# Patient Record
Sex: FEMALE | Race: BLACK OR AFRICAN AMERICAN | NOT HISPANIC OR LATINO | Employment: PART TIME | URBAN - METROPOLITAN AREA
[De-identification: names, ages, dates, MRNs, and addresses within clinical notes are randomized per-mention and may not be internally consistent; named-entity substitution may affect disease eponyms.]

---

## 2018-02-08 ENCOUNTER — TRANSCRIBE ORDERS (OUTPATIENT)
Dept: LAB | Facility: MEDICAL CENTER | Age: 23
End: 2018-02-08

## 2018-02-08 ENCOUNTER — OFFICE VISIT (OUTPATIENT)
Dept: OBGYN CLINIC | Facility: MEDICAL CENTER | Age: 23
End: 2018-02-08
Payer: COMMERCIAL

## 2018-02-08 ENCOUNTER — LAB (OUTPATIENT)
Dept: LAB | Facility: MEDICAL CENTER | Age: 23
End: 2018-02-08
Payer: COMMERCIAL

## 2018-02-08 VITALS
DIASTOLIC BLOOD PRESSURE: 70 MMHG | WEIGHT: 129 LBS | SYSTOLIC BLOOD PRESSURE: 120 MMHG | HEIGHT: 65 IN | BODY MASS INDEX: 21.49 KG/M2

## 2018-02-08 DIAGNOSIS — Z77.21 PERSONAL HISTORY OF EXPOSURE TO POTENTIALLY HAZARDOUS BODY FLUIDS: ICD-10-CM

## 2018-02-08 DIAGNOSIS — Z01.419 ENCOUNTER FOR GYNECOLOGICAL EXAMINATION WITHOUT ABNORMAL FINDING: Primary | ICD-10-CM

## 2018-02-08 LAB
HBV SURFACE AG SER QL: NORMAL
HCV AB SER QL: NORMAL

## 2018-02-08 PROCEDURE — 86803 HEPATITIS C AB TEST: CPT

## 2018-02-08 PROCEDURE — 87389 HIV-1 AG W/HIV-1&-2 AB AG IA: CPT

## 2018-02-08 PROCEDURE — 36415 COLL VENOUS BLD VENIPUNCTURE: CPT

## 2018-02-08 PROCEDURE — G0145 SCR C/V CYTO,THINLAYER,RESCR: HCPCS | Performed by: NURSE PRACTITIONER

## 2018-02-08 PROCEDURE — 87491 CHLMYD TRACH DNA AMP PROBE: CPT | Performed by: NURSE PRACTITIONER

## 2018-02-08 PROCEDURE — 87591 N.GONORRHOEAE DNA AMP PROB: CPT | Performed by: NURSE PRACTITIONER

## 2018-02-08 PROCEDURE — 86592 SYPHILIS TEST NON-TREP QUAL: CPT

## 2018-02-08 PROCEDURE — 99395 PREV VISIT EST AGE 18-39: CPT | Performed by: NURSE PRACTITIONER

## 2018-02-08 PROCEDURE — 87340 HEPATITIS B SURFACE AG IA: CPT

## 2018-02-08 RX ORDER — DROSPIRENONE AND ETHINYL ESTRADIOL 0.02-3(28)
1 KIT ORAL DAILY
COMMUNITY
End: 2018-02-08 | Stop reason: ALTCHOICE

## 2018-02-08 RX ORDER — SULFAMETHOXAZOLE AND TRIMETHOPRIM 800; 160 MG/1; MG/1
1 TABLET ORAL
COMMUNITY
Start: 2015-06-03 | End: 2018-02-08 | Stop reason: ALTCHOICE

## 2018-02-08 NOTE — PROGRESS NOTES
Josh Smith is a 23 y o  Darleen Gali (eTOP x1, medical Ab in 2016) female who presents for a routine annual GYN exam  Previous GYN care was at a family practice in [place] , had routine visit last year per patient  Patient's last menstrual period was 2018 (exact date)  Periods are regular every 27 days, lasting 5 days  Not currently sexually active  Desires STI testing today  Uses condoms as birth control, does not desire any other form at this time  Reports that she had Gardasil vaccination series at our facility years ago  Currently unemployed, was working as an  at [employer] previously  Gynecologic History  Last Pap smear: 2017  Results were: normal (per patient report, no records available)  History of abnormal Pap smear: no (per patient report)  Regular self breast exam: irregularly  Family history of breast cancer: no  Family history of uterine or ovarian cancer: no  Current contraception: condoms    Counseled on breast self-exam, STD prevention, HIV risk factors and prevention and family planning choices        Obstetric History  OB History    Para Term  AB Living   1 0     1 0   SAB TAB Ectopic Multiple Live Births                  # Outcome Date GA Lbr Tom/2nd Weight Sex Delivery Anes PTL Lv   1 AB 2016 5w0d               The following portions of the patient's history were reviewed and updated as appropriate: allergies, current medications, past family history, past medical history, past social history, past surgical history and problem list       Review of Systems  Review of Systems - General ROS: negative for - chills or fever  Psychological ROS: negative for - depression  Breast ROS: negative for breast lumps  Respiratory ROS: no cough, shortness of breath, or wheezing  Cardiovascular ROS: no chest pain or dyspnea on exertion  Gastrointestinal ROS: no abdominal pain, change in bowel habits, or black or bloody stools  Genito-Urinary ROS: negative for - change in menstrual cycle, genital ulcers, urinary frequency/urgency or vulvar/vaginal symptoms        Objective    /70   Ht 5' 5" (1 651 m)   Wt 58 5 kg (129 lb)   LMP 01/26/2018 (Exact Date)   Breastfeeding? No   BMI 21 47 kg/m²     Physical Exam   Constitutional: She is oriented to person, place, and time  Vital signs are normal  She appears well-developed and well-nourished  Genitourinary: Vagina normal and uterus normal  Pelvic exam was performed with patient supine  There is no rash, tenderness or lesion on the right labia  There is no rash, tenderness or lesion on the left labia  Right adnexum does not display mass and does not display tenderness  Left adnexum does not display mass and does not display tenderness  Cervix does not exhibit motion tenderness  Uterus is retroverted  HENT:   Head: Normocephalic and atraumatic  Neck: No thyromegaly present  Cardiovascular: Normal heart sounds  Pulmonary/Chest: Effort normal and breath sounds normal  Right breast exhibits no inverted nipple, no mass, no nipple discharge, no skin change and no tenderness  Left breast exhibits no inverted nipple, no mass, no nipple discharge, no skin change and no tenderness  Abdominal: Soft  Normal appearance  Lymphadenopathy:     She has no axillary adenopathy  Neurological: She is alert and oriented to person, place, and time  Skin: Skin is warm, dry and intact  Psychiatric: She has a normal mood and affect  Her behavior is normal    Vitals reviewed  Assessment   Normal female exam      Plan  Given [employer] pamphlet, she says that she does not do well with hormonal contraceptive methods due to mood swings, she will consider copper IUD and call if she decides she wants to have this placed      Encounter for gynecological examination without abnormal finding  -     Liquid-based pap, screening  -     Chlamydia/GC amplified DNA by PCR    Personal history of exposure to potentially hazardous body fluids  -     RPR; Future  -     Hepatitis B surface antigen; Future  -     Hepatitis C antibody; Future  -     Chlamydia/GC amplified DNA by PCR  -     HIV 1/2 AG-AB combo;  Future      ANALI Beard

## 2018-02-09 LAB
CHLAMYDIA DNA CVX QL NAA+PROBE: NORMAL
N GONORRHOEA DNA GENITAL QL NAA+PROBE: NORMAL

## 2018-02-10 LAB
HIV 1+2 AB+HIV1 P24 AG SERPL QL IA: NORMAL
RPR SER QL: NORMAL

## 2018-02-11 NOTE — PROGRESS NOTES
Please call patient regarding her NORMAL results - this is an add-on to previous message re: normal/negative STI testing  We are awaiting her pap smear results  Thank you!

## 2018-02-12 NOTE — PROGRESS NOTES
Tried calling patient and message was "the person you are trying to reach is not available at this time "

## 2018-02-14 LAB
LAB AP GYN PRIMARY INTERPRETATION: NORMAL
LAB AP LMP: NORMAL
Lab: NORMAL

## 2018-02-15 NOTE — PROGRESS NOTES
Pap smear results are normal, if we cannot get into contact with her by phone, can we send her a normal pap letter please? Thanks!

## 2020-01-07 ENCOUNTER — OFFICE VISIT (OUTPATIENT)
Dept: URGENT CARE | Facility: CLINIC | Age: 25
End: 2020-01-07
Payer: COMMERCIAL

## 2020-01-07 VITALS
TEMPERATURE: 98.3 F | BODY MASS INDEX: 21.54 KG/M2 | DIASTOLIC BLOOD PRESSURE: 70 MMHG | OXYGEN SATURATION: 98 % | WEIGHT: 126.2 LBS | HEIGHT: 64 IN | HEART RATE: 67 BPM | RESPIRATION RATE: 18 BRPM | SYSTOLIC BLOOD PRESSURE: 98 MMHG

## 2020-01-07 DIAGNOSIS — H00.015 HORDEOLUM EXTERNUM OF LEFT LOWER EYELID: Primary | ICD-10-CM

## 2020-01-07 PROCEDURE — G0383 LEV 4 HOSP TYPE B ED VISIT: HCPCS | Performed by: PHYSICIAN ASSISTANT

## 2020-01-07 RX ORDER — TOBRAMYCIN 3 MG/ML
1 SOLUTION/ DROPS OPHTHALMIC
Qty: 1 BOTTLE | Refills: 0 | Status: SHIPPED | OUTPATIENT
Start: 2020-01-07 | End: 2020-01-14

## 2020-01-07 NOTE — PATIENT INSTRUCTIONS
1  Stye  -Use eye drops as directed  -Warm compresses  -Wash hands well  -Throw out eye make-up    -F/U with PCP/Eye doctor if no improvement    Go to ER with worsening symptoms or any signs of distress

## 2020-01-07 NOTE — PROGRESS NOTES
[facility]        NAME: Sahil Lowery is a 30 y o  female  : 1995    MRN: [id number]  DATE: 2020  TIME: 6:22 PM    Assessment and Plan   Hordeolum externum of left lower eyelid [H00 015]  1  Hordeolum externum of left lower eyelid  tobramycin (TOBREX) 0 3 % SOLN         Patient Instructions     Patient Instructions   1  Stye  -Use eye drops as directed  -Warm compresses  -Wash hands well  -Throw out eye make-up    -F/U with PCP/Eye doctor if no improvement    Go to ER with worsening symptoms or any signs of distress     Follow up with PCP in 3-5 days  Proceed to  ER if symptoms worsen  Chief Complaint     Chief Complaint   Patient presents with    Eye Problem     L bottom eye lid  believes it may be a stye  started yesterday  History of Present Illness       Patient is a 61-year-old female who presents today for evaluation of pain and swelling to her left lower eyelid for the past couple days  Patient states she tried applying warm compresses however her symptoms have become worse  She rates her pain as a 5/10  No visual changes  No injury or trauma  No fever  Review of Systems   Review of Systems   Constitutional: Negative for chills and fever  HENT: Negative for ear pain, rhinorrhea and sore throat  Eyes: Positive for pain  Negative for photophobia and visual disturbance  Respiratory: Negative for shortness of breath  Cardiovascular: Negative for chest pain  Musculoskeletal: Negative for arthralgias  Neurological: Negative for headaches  All other systems reviewed and are negative          Current Medications       Current Outpatient Medications:     tobramycin (TOBREX) 0 3 % SOLN, Administer 1 drop into the left eye every 4 (four) hours while awake for 7 days, Disp: 1 Bottle, Rfl: 0    Current Allergies     Allergies as of 2020    (No Known Allergies)            The following portions of the patient's history were reviewed and updated as appropriate: allergies, current medications, past family history, past medical history, past social history, past surgical history and problem list      Past Medical History:   Diagnosis Date    Bacterial vaginitis     Carrier of ureaplasma urealyticum     Medical      Migraine     Urinary tract infection     Yeast vaginitis        History reviewed  No pertinent surgical history  Family History   Problem Relation Age of Onset    Obesity Mother     Hypertension Family     Diabetes Family     Osteoarthritis Family     Depression Family     Thyroid disease Family          Medications have been verified  Objective   BP 98/70 (BP Location: Left arm, Patient Position: Sitting)   Pulse 67   Temp 98 3 °F (36 8 °C) (Oral)   Resp 18   Ht 5' 4" (1 626 m)   Wt 57 2 kg (126 lb 3 2 oz)   SpO2 98%   BMI 21 66 kg/m²        Physical Exam     Physical Exam   Constitutional: She is oriented to person, place, and time  She appears well-developed and well-nourished  No distress  HENT:   Head: Normocephalic and atraumatic  Right Ear: Tympanic membrane, external ear and ear canal normal    Left Ear: Tympanic membrane, external ear and ear canal normal    Nose: Nose normal    Mouth/Throat: Uvula is midline, oropharynx is clear and moist and mucous membranes are normal    Eyes: Pupils are equal, round, and reactive to light  Conjunctivae and EOM are normal  Left eye exhibits hordeolum  Neck: Normal range of motion  Neck supple  Cardiovascular: Normal rate, regular rhythm and normal heart sounds  Pulmonary/Chest: Effort normal and breath sounds normal    Neurological: She is alert and oriented to person, place, and time  Skin: Skin is warm and dry  Psychiatric: She has a normal mood and affect  Nursing note and vitals reviewed

## 2020-01-08 ENCOUNTER — TELEPHONE (OUTPATIENT)
Dept: URGENT CARE | Facility: CLINIC | Age: 25
End: 2020-01-08

## 2020-01-08 NOTE — TELEPHONE ENCOUNTER
Patient called 1/8/2020 asking for a note for work for 1/8/2020  Patient was seen by Rob Jose on 1/7/2020

## 2020-04-04 ENCOUNTER — APPOINTMENT (OUTPATIENT)
Dept: RADIOLOGY | Facility: CLINIC | Age: 25
End: 2020-04-04
Payer: COMMERCIAL

## 2020-04-04 ENCOUNTER — OFFICE VISIT (OUTPATIENT)
Dept: URGENT CARE | Facility: CLINIC | Age: 25
End: 2020-04-04
Payer: COMMERCIAL

## 2020-04-04 VITALS
SYSTOLIC BLOOD PRESSURE: 104 MMHG | HEIGHT: 64 IN | RESPIRATION RATE: 16 BRPM | TEMPERATURE: 99.4 F | BODY MASS INDEX: 20.83 KG/M2 | OXYGEN SATURATION: 97 % | HEART RATE: 118 BPM | DIASTOLIC BLOOD PRESSURE: 70 MMHG | WEIGHT: 122 LBS

## 2020-04-04 DIAGNOSIS — M25.532 LEFT WRIST PAIN: Primary | ICD-10-CM

## 2020-04-04 DIAGNOSIS — M25.532 LEFT WRIST PAIN: ICD-10-CM

## 2020-04-04 PROCEDURE — 99283 EMERGENCY DEPT VISIT LOW MDM: CPT | Performed by: PHYSICIAN ASSISTANT

## 2020-04-04 PROCEDURE — G0382 LEV 3 HOSP TYPE B ED VISIT: HCPCS | Performed by: PHYSICIAN ASSISTANT

## 2020-04-04 PROCEDURE — 99213 OFFICE O/P EST LOW 20 MIN: CPT | Performed by: PHYSICIAN ASSISTANT

## 2020-04-04 PROCEDURE — 73110 X-RAY EXAM OF WRIST: CPT

## 2020-04-04 RX ORDER — CETIRIZINE HYDROCHLORIDE 10 MG/1
10 TABLET ORAL DAILY
COMMUNITY
End: 2021-12-15 | Stop reason: ALTCHOICE

## 2020-04-06 ENCOUNTER — HOSPITAL ENCOUNTER (EMERGENCY)
Facility: HOSPITAL | Age: 25
Discharge: HOME | End: 2020-04-06
Attending: EMERGENCY MEDICINE | Admitting: EMERGENCY MEDICINE
Payer: COMMERCIAL

## 2020-04-06 VITALS
HEIGHT: 64 IN | OXYGEN SATURATION: 99 % | TEMPERATURE: 98.7 F | DIASTOLIC BLOOD PRESSURE: 67 MMHG | RESPIRATION RATE: 16 BRPM | HEART RATE: 87 BPM | BODY MASS INDEX: 20.83 KG/M2 | WEIGHT: 122 LBS | SYSTOLIC BLOOD PRESSURE: 121 MMHG

## 2020-04-06 DIAGNOSIS — M25.532 LEFT WRIST PAIN: Primary | ICD-10-CM

## 2020-04-06 PROCEDURE — 99282 EMERGENCY DEPT VISIT SF MDM: CPT | Performed by: EMERGENCY MEDICINE

## 2020-04-06 PROCEDURE — 99283 EMERGENCY DEPT VISIT LOW MDM: CPT

## 2020-04-06 RX ORDER — NAPROXEN 500 MG/1
500 TABLET ORAL 2 TIMES DAILY PRN
Qty: 20 TABLET | Refills: 0 | Status: SHIPPED | OUTPATIENT
Start: 2020-04-06 | End: 2021-12-15 | Stop reason: ALTCHOICE

## 2020-04-06 RX ORDER — NAPROXEN 250 MG/1
500 TABLET ORAL ONCE
Status: COMPLETED | OUTPATIENT
Start: 2020-04-06 | End: 2020-04-06

## 2020-04-06 RX ADMIN — NAPROXEN 500 MG: 250 TABLET ORAL at 06:13

## 2020-04-09 ENCOUNTER — OFFICE VISIT (OUTPATIENT)
Dept: OBGYN CLINIC | Facility: CLINIC | Age: 25
End: 2020-04-09
Payer: COMMERCIAL

## 2020-04-09 VITALS
HEIGHT: 64 IN | BODY MASS INDEX: 19.97 KG/M2 | DIASTOLIC BLOOD PRESSURE: 68 MMHG | HEART RATE: 92 BPM | SYSTOLIC BLOOD PRESSURE: 102 MMHG | WEIGHT: 117 LBS

## 2020-04-09 DIAGNOSIS — L03.114 CELLULITIS OF LEFT ARM: Primary | ICD-10-CM

## 2020-04-09 PROCEDURE — 99203 OFFICE O/P NEW LOW 30 MIN: CPT | Performed by: ORTHOPAEDIC SURGERY

## 2020-04-09 RX ORDER — CEPHALEXIN 500 MG/1
500 CAPSULE ORAL EVERY 6 HOURS SCHEDULED
Qty: 20 CAPSULE | Refills: 0 | Status: SHIPPED | OUTPATIENT
Start: 2020-04-09 | End: 2020-04-14

## 2020-04-10 ENCOUNTER — HOSPITAL ENCOUNTER (OUTPATIENT)
Dept: MRI IMAGING | Facility: HOSPITAL | Age: 25
Discharge: HOME | End: 2020-04-10
Payer: COMMERCIAL

## 2020-04-10 DIAGNOSIS — L03.114 CELLULITIS OF LEFT ARM: ICD-10-CM

## 2020-04-10 PROCEDURE — 73221 MRI JOINT UPR EXTREM W/O DYE: CPT

## 2020-04-13 ENCOUNTER — OFFICE VISIT (OUTPATIENT)
Dept: OBGYN CLINIC | Facility: CLINIC | Age: 25
End: 2020-04-13
Payer: COMMERCIAL

## 2020-04-13 VITALS
SYSTOLIC BLOOD PRESSURE: 110 MMHG | WEIGHT: 117 LBS | HEIGHT: 64 IN | BODY MASS INDEX: 19.97 KG/M2 | HEART RATE: 109 BPM | DIASTOLIC BLOOD PRESSURE: 75 MMHG

## 2020-04-13 DIAGNOSIS — L03.114 CELLULITIS OF LEFT ARM: Primary | ICD-10-CM

## 2020-04-13 PROCEDURE — 99213 OFFICE O/P EST LOW 20 MIN: CPT | Performed by: ORTHOPAEDIC SURGERY

## 2020-06-01 ENCOUNTER — OFFICE VISIT (OUTPATIENT)
Dept: OBGYN CLINIC | Facility: MEDICAL CENTER | Age: 25
End: 2020-06-01
Payer: COMMERCIAL

## 2020-06-01 VITALS
BODY MASS INDEX: 21.7 KG/M2 | DIASTOLIC BLOOD PRESSURE: 80 MMHG | TEMPERATURE: 98.5 F | SYSTOLIC BLOOD PRESSURE: 138 MMHG | WEIGHT: 126.4 LBS

## 2020-06-01 DIAGNOSIS — Z11.3 SCREENING FOR STDS (SEXUALLY TRANSMITTED DISEASES): Primary | ICD-10-CM

## 2020-06-01 DIAGNOSIS — N89.8 VAGINAL DISCHARGE: ICD-10-CM

## 2020-06-01 LAB
BV WHIFF TEST VAG QL: POSITIVE
CLUE CELLS SPEC QL WET PREP: POSITIVE
PH SMN: 5 [PH]
SL AMB POCT WET MOUNT: ABNORMAL
T VAGINALIS VAG QL WET PREP: NEGATIVE
YEAST VAG QL WET PREP: ABNORMAL

## 2020-06-01 PROCEDURE — 99213 OFFICE O/P EST LOW 20 MIN: CPT | Performed by: NURSE PRACTITIONER

## 2020-06-01 PROCEDURE — 87591 N.GONORRHOEAE DNA AMP PROB: CPT | Performed by: NURSE PRACTITIONER

## 2020-06-01 PROCEDURE — 87210 SMEAR WET MOUNT SALINE/INK: CPT | Performed by: NURSE PRACTITIONER

## 2020-06-01 PROCEDURE — 87491 CHLMYD TRACH DNA AMP PROBE: CPT | Performed by: NURSE PRACTITIONER

## 2020-06-01 RX ORDER — METRONIDAZOLE 500 MG/1
500 TABLET ORAL EVERY 12 HOURS SCHEDULED
Qty: 14 TABLET | Refills: 0 | Status: SHIPPED | OUTPATIENT
Start: 2020-06-01 | End: 2020-06-08

## 2020-06-02 LAB
C TRACH DNA SPEC QL NAA+PROBE: NEGATIVE
N GONORRHOEA DNA SPEC QL NAA+PROBE: NEGATIVE

## 2020-06-04 ENCOUNTER — OFFICE VISIT (OUTPATIENT)
Dept: URGENT CARE | Facility: CLINIC | Age: 25
End: 2020-06-04
Payer: COMMERCIAL

## 2020-06-04 VITALS
RESPIRATION RATE: 16 BRPM | TEMPERATURE: 99.3 F | DIASTOLIC BLOOD PRESSURE: 76 MMHG | SYSTOLIC BLOOD PRESSURE: 120 MMHG | WEIGHT: 123 LBS | HEART RATE: 87 BPM | BODY MASS INDEX: 21.11 KG/M2 | OXYGEN SATURATION: 99 %

## 2020-06-04 DIAGNOSIS — J02.9 SORE THROAT: Primary | ICD-10-CM

## 2020-06-04 LAB — S PYO AG THROAT QL: NEGATIVE

## 2020-06-04 PROCEDURE — 87880 STREP A ASSAY W/OPTIC: CPT | Performed by: PHYSICIAN ASSISTANT

## 2020-06-04 PROCEDURE — 99213 OFFICE O/P EST LOW 20 MIN: CPT | Performed by: PHYSICIAN ASSISTANT

## 2020-06-04 PROCEDURE — G0382 LEV 3 HOSP TYPE B ED VISIT: HCPCS | Performed by: PHYSICIAN ASSISTANT

## 2020-06-04 PROCEDURE — 99283 EMERGENCY DEPT VISIT LOW MDM: CPT | Performed by: PHYSICIAN ASSISTANT

## 2020-06-04 RX ORDER — AZITHROMYCIN 250 MG/1
TABLET, FILM COATED ORAL
Qty: 6 TABLET | Refills: 0 | Status: SHIPPED | OUTPATIENT
Start: 2020-06-04 | End: 2020-06-08

## 2020-06-05 ENCOUNTER — TELEPHONE (OUTPATIENT)
Dept: URGENT CARE | Facility: CLINIC | Age: 25
End: 2020-06-05

## 2020-06-29 ENCOUNTER — TELEPHONE (OUTPATIENT)
Dept: OBGYN CLINIC | Facility: MEDICAL CENTER | Age: 25
End: 2020-06-29

## 2021-01-04 ENCOUNTER — OFFICE VISIT (OUTPATIENT)
Dept: OBGYN CLINIC | Facility: MEDICAL CENTER | Age: 26
End: 2021-01-04
Payer: COMMERCIAL

## 2021-01-04 VITALS — SYSTOLIC BLOOD PRESSURE: 92 MMHG | DIASTOLIC BLOOD PRESSURE: 76 MMHG | WEIGHT: 130.2 LBS | BODY MASS INDEX: 22.35 KG/M2

## 2021-01-04 DIAGNOSIS — N89.8 VAGINAL ODOR: Primary | ICD-10-CM

## 2021-01-04 DIAGNOSIS — Z11.3 SCREENING FOR STDS (SEXUALLY TRANSMITTED DISEASES): ICD-10-CM

## 2021-01-04 LAB
BV WHIFF TEST VAG QL: NEGATIVE
CLUE CELLS SPEC QL WET PREP: NEGATIVE
PH SMN: 4.5 [PH]
SL AMB POCT WET MOUNT: ABNORMAL
T VAGINALIS VAG QL WET PREP: NEGATIVE
YEAST VAG QL WET PREP: ABNORMAL

## 2021-01-04 PROCEDURE — 87491 CHLMYD TRACH DNA AMP PROBE: CPT | Performed by: NURSE PRACTITIONER

## 2021-01-04 PROCEDURE — 99213 OFFICE O/P EST LOW 20 MIN: CPT | Performed by: NURSE PRACTITIONER

## 2021-01-04 PROCEDURE — 87591 N.GONORRHOEAE DNA AMP PROB: CPT | Performed by: NURSE PRACTITIONER

## 2021-01-04 PROCEDURE — 87210 SMEAR WET MOUNT SALINE/INK: CPT | Performed by: NURSE PRACTITIONER

## 2021-01-04 RX ORDER — FLUCONAZOLE 150 MG/1
TABLET ORAL
Qty: 2 TABLET | Refills: 0 | Status: SHIPPED | OUTPATIENT
Start: 2021-01-04 | End: 2021-01-04 | Stop reason: ALTCHOICE

## 2021-01-04 NOTE — ASSESSMENT & PLAN NOTE
Exam consistent with candidiasis  Recommended Diflucan, terazole cream, conservative vulvar hygiene, pelvic rest until sx fully resolved  Discussed common triggers and what to avoid  Reviewed sx to report and reasons to call

## 2021-01-04 NOTE — PROGRESS NOTES
Vaginal odor    Exam consistent with candidiasis  Recommended Diflucan (declined), terazole cream, conservative vulvar hygiene, pelvic rest until sx fully resolved  Discussed common triggers and what to avoid  Reviewed sx to report and reasons to call  Screening for STDs (sexually transmitted diseases)  GC/CT sent via culture  Lab slip given for HIV, RPR, Hep B/C  Diagnoses and all orders for this visit:    Vaginal odor  -     POCT wet mount  -     Discontinue: fluconazole (DIFLUCAN) 150 mg tablet; Take one tablet po today and repeat 3 days  -     terconazole (TERAZOL 3) 0 8 % vaginal cream; Insert 1 applicator into the vagina daily at bedtime    Screening for STDs (sexually transmitted diseases)  -     Chlamydia/GC amplified DNA by PCR  -     Hepatitis B surface antigen; Future  -     Hepatitis C antibody; Future  -     HIV 1/2 Antigen/Antibody (4th Generation) w Reflex [facility]; Future  -     RPR; Future         Call if no symptom improvement, all questions answered, return for annual       Elena 30 y o  presents with vaginal complaints of a thick white discharge with an odor x 1 month  Denies itching or burning  She did use OTC monistat and boric acid with some relief but came back after menses  Denies recent antibiotic use  Denies douching  Denies fever, pelvic/abdominal pain or UTI symptoms  Denies being sexually active but does want STI testing  Also complains of painful menses and wants to know if she has endometriosis  Discussed surgery the only way to dx but can use BC to manage symptoms-declines  She gets dizzy just discussing symptoms  Past Medical History:   Diagnosis Date    Bacterial vaginitis     Carrier of ureaplasma urealyticum     Medical      Migraine     Urinary tract infection     Yeast vaginitis      No past surgical history on file    Social History     Tobacco Use    Smoking status: Never Smoker    Smokeless tobacco: Never Used   Substance Use Topics    Alcohol use: Yes     Comment: social    Drug use: Yes     Types: Marijuana     Family History   Problem Relation Age of Onset    Obesity Mother     Hypertension Family     Diabetes Family     Osteoarthritis Family     Depression Family     Thyroid disease Family        Current Outpatient Medications:     cetirizine (ZyrTEC) 10 mg tablet, Take 10 mg by mouth daily, Disp: , Rfl:     naproxen (NAPROSYN) 500 mg tablet, Take 1 tablet (500 mg total) by mouth 2 (two) times a day as needed for moderate pain for up to 20 doses (Patient not taking: Reported on 2020), Disp: 20 tablet, Rfl: 0    terconazole (TERAZOL 3) 0 8 % vaginal cream, Insert 1 applicator into the vagina daily at bedtime, Disp: 20 g, Rfl: 0    No Known Allergies  OB History    Para Term  AB Living   1 0     1 0   SAB TAB Ectopic Multiple Live Births                  # Outcome Date GA Lbr Tom/2nd Weight Sex Delivery Anes PTL Lv   1 AB 2016 5w0d              Vitals:    21 1118   BP: 92/76   BP Location: Left arm   Patient Position: Sitting   Cuff Size: Standard   Weight: 59 1 kg (130 lb 3 2 oz)     Body mass index is 22 35 kg/m²  Review of Systems   Constitutional: Negative for chills, fatigue, fever and unexpected weight change  Respiratory: Negative for shortness of breath  Gastrointestinal: Negative for abdominal pain    Genitourinary: Negative for difficulty urinating, frequency, dysuria, hematuria and pelvic pain  Denies burning or itching   Positive for vaginal discharge and odor        Physical Exam   BP 92/76 (BP Location: Left arm, Patient Position: Sitting, Cuff Size: Standard)   Wt 59 1 kg (130 lb 3 2 oz)   LMP 2020   BMI 22 35 kg/m²   She appears well and is in no distress  Abdomen is soft and nontender without masses  External genitals are normal without lesions or rashes    Urethral meatus and urethra are normal  Bladder is normal to palpation  Vagina with thick white discharge -no bleeding Cervix is posterior normal without discharge or lesion  Uterus is normal, mobile, nontender without palpable mass  Adnexa are normal, nontender, without palpable mass     Skin warm and dry  Capillary refill < 2 seconds  Alert and oriented x 3 with normal affect      Wet mount showed ph 4 5,, negative clue cells, wbc, trich, whiff test   Positive yeast

## 2021-01-05 LAB
C TRACH DNA SPEC QL NAA+PROBE: NEGATIVE
N GONORRHOEA DNA SPEC QL NAA+PROBE: NEGATIVE

## 2021-01-07 DIAGNOSIS — N89.8 VAGINAL ODOR: Primary | ICD-10-CM

## 2021-01-07 DIAGNOSIS — N89.8 VAGINAL DISCHARGE: ICD-10-CM

## 2021-01-07 RX ORDER — CLOTRIMAZOLE 1 %
1 CREAM WITH APPLICATOR VAGINAL DAILY
Qty: 45 G | Refills: 0 | Status: SHIPPED | OUTPATIENT
Start: 2021-01-07 | End: 2021-09-03 | Stop reason: ALTCHOICE

## 2021-01-07 NOTE — TELEPHONE ENCOUNTER
pharmacy said the terconazole is not covered [employer], but clotrimazole 1% vag cream is, will that work?

## 2021-01-20 ENCOUNTER — TELEPHONE (OUTPATIENT)
Dept: OBGYN CLINIC | Facility: CLINIC | Age: 26
End: 2021-01-20

## 2021-01-20 DIAGNOSIS — N89.8 VAGINAL ODOR: Primary | ICD-10-CM

## 2021-01-20 RX ORDER — METRONIDAZOLE 500 MG/1
500 TABLET ORAL EVERY 12 HOURS SCHEDULED
Qty: 14 TABLET | Refills: 0 | Status: SHIPPED | OUTPATIENT
Start: 2021-01-20 | End: 2021-01-27

## 2021-01-20 NOTE — TELEPHONE ENCOUNTER
Spoke to pt and reviewed results from STD testing  Pt said her phone has been off for awhile  Pt also wanted to let Keith Nguyễn know that she is having BV symptoms of runny d/c and odor  Confirmed pharmacy on file

## 2021-01-21 ENCOUNTER — TELEPHONE (OUTPATIENT)
Dept: OBGYN CLINIC | Facility: CLINIC | Age: 26
End: 2021-01-21

## 2021-09-03 ENCOUNTER — TELEPHONE (OUTPATIENT)
Dept: OBGYN CLINIC | Facility: CLINIC | Age: 26
End: 2021-09-03

## 2021-09-03 ENCOUNTER — OFFICE VISIT (OUTPATIENT)
Dept: OBGYN CLINIC | Facility: CLINIC | Age: 26
End: 2021-09-03
Payer: COMMERCIAL

## 2021-09-03 VITALS — DIASTOLIC BLOOD PRESSURE: 65 MMHG | WEIGHT: 125.2 LBS | BODY MASS INDEX: 21.49 KG/M2 | SYSTOLIC BLOOD PRESSURE: 100 MMHG

## 2021-09-03 DIAGNOSIS — Z71.85 HPV VACCINE COUNSELING: ICD-10-CM

## 2021-09-03 DIAGNOSIS — Z11.3 SCREENING FOR STD (SEXUALLY TRANSMITTED DISEASE): ICD-10-CM

## 2021-09-03 DIAGNOSIS — N89.8 VAGINAL ODOR: Primary | ICD-10-CM

## 2021-09-03 LAB
BV WHIFF TEST VAG QL: ABNORMAL
CLUE CELLS SPEC QL WET PREP: ABNORMAL
PH SMN: 5 [PH]
SL AMB POCT WET MOUNT: ABNORMAL
T VAGINALIS VAG QL WET PREP: ABNORMAL
YEAST VAG QL WET PREP: ABNORMAL

## 2021-09-03 PROCEDURE — 99213 OFFICE O/P EST LOW 20 MIN: CPT | Performed by: OBSTETRICS & GYNECOLOGY

## 2021-09-03 PROCEDURE — 87591 N.GONORRHOEAE DNA AMP PROB: CPT | Performed by: OBSTETRICS & GYNECOLOGY

## 2021-09-03 PROCEDURE — 87210 SMEAR WET MOUNT SALINE/INK: CPT | Performed by: OBSTETRICS & GYNECOLOGY

## 2021-09-03 PROCEDURE — 87491 CHLMYD TRACH DNA AMP PROBE: CPT | Performed by: OBSTETRICS & GYNECOLOGY

## 2021-09-03 RX ORDER — METRONIDAZOLE 500 MG/1
500 TABLET ORAL EVERY 12 HOURS SCHEDULED
Qty: 14 TABLET | Refills: 0 | Status: SHIPPED | OUTPATIENT
Start: 2021-09-03 | End: 2021-09-10

## 2021-09-03 NOTE — ASSESSMENT & PLAN NOTE
Mild vaginal odor and irritation  Thin vaginal discharge     Results for orders placed or performed in visit on 09/03/21   POCT wet mount   Result Value Ref Range    WET MOUNT n/a     Yeast, Wet Prep pos     pH 5 0     Whiff Test pos     Clue Cells pos     Trich, Wet Prep neg      Terazol x 7 nights   Metonidazole po

## 2021-09-03 NOTE — PROGRESS NOTES
Assessment/Plan:    Vaginal odor  Mild vaginal odor and irritation  Thin vaginal discharge     Results for orders placed or performed in visit on 09/03/21   POCT wet mount   Result Value Ref Range    WET MOUNT n/a     Yeast, Wet Prep pos     pH 5 0     Whiff Test pos     Clue Cells pos     Trich, Wet Prep neg      Terazol x 7 nights   Metonidazole po      Screening for STDs (sexually transmitted diseases)  Collection for Gc & CT   Lab orders placed for STD testing          Diagnoses and all orders for this visit:    Vaginal odor  -     POCT wet mount  -     metroNIDAZOLE (FLAGYL) 500 mg tablet; Take 1 tablet (500 mg total) by mouth every 12 (twelve) hours for 7 days  -     terconazole (TERAZOL 7) 0 4 % vaginal cream; Insert 1 applicator into the vagina daily at bedtime    Screening for STD (sexually transmitted disease)  -     Chlamydia/GC amplified DNA by PCR  -     Hepatitis B surface antigen; Future  -     Hepatitis C antibody; Future  -     Human Immunodeficiency Virus 1/2 Antigen / Antibody ( Fourth Generation) with Reflex Testing; Future  -     RPR; Future    HPV vaccine counseling          Subjective:      Patient ID: Dinorah Jeff is a 30 y o  female  29 yo presents for STD testing and vaginal odor with slight vaginal irritation, new partner in the last year  Hx of BV in the past  Recent antibiotic use  No other concerns today   Works as a  at [employer]       The following portions of the patient's history were reviewed and updated as appropriate: allergies, current medications, past family history, past medical history, past social history, past surgical history and problem list     Review of Systems   Constitutional: Negative for chills, fatigue and fever  Eyes: Negative for visual disturbance  Respiratory: Negative for cough and shortness of breath  Cardiovascular: Negative for chest pain  Gastrointestinal: Negative for abdominal pain     Genitourinary: Positive for vaginal discharge (thin discharge with slight odor )  Negative for vaginal bleeding  Objective:      /65 (BP Location: Left arm, Patient Position: Sitting, Cuff Size: Standard)   Wt 56 8 kg (125 lb 3 2 oz)   LMP 08/23/2021 (Exact Date)   BMI 21 49 kg/m²          Physical Exam  Vitals and nursing note reviewed  Constitutional:       Appearance: Normal appearance  She is normal weight  HENT:      Head: Normocephalic and atraumatic  Eyes:      Conjunctiva/sclera: Conjunctivae normal    Cardiovascular:      Rate and Rhythm: Normal rate  Pulmonary:      Effort: Pulmonary effort is normal    Genitourinary:     General: Normal vulva  Exam position: Lithotomy position  Robbie stage (genital): 5  Labia:         Right: No rash, tenderness, lesion or injury  Left: No rash, tenderness or lesion  Vagina: Vaginal discharge present  Cervix: Normal       Uterus: Normal        Adnexa: Right adnexa normal and left adnexa normal    Musculoskeletal:         General: Normal range of motion  Cervical back: Normal range of motion  Lymphadenopathy:      Lower Body: No right inguinal adenopathy  No left inguinal adenopathy  Skin:     General: Skin is warm and dry  Neurological:      Mental Status: She is alert  Psychiatric:         Mood and Affect: Mood normal          Behavior: Behavior normal          Thought Content:  Thought content normal          Judgment: Judgment normal        f/u if no resolution of symptoms

## 2021-09-03 NOTE — TELEPHONE ENCOUNTER
A pharmacist called requesting a prior autho for Terconazole Vaginal cream for Elena  Please advise!

## 2021-09-04 LAB
C TRACH DNA SPEC QL NAA+PROBE: NEGATIVE
N GONORRHOEA DNA SPEC QL NAA+PROBE: NEGATIVE

## 2021-09-23 ENCOUNTER — OFFICE VISIT (OUTPATIENT)
Dept: URGENT CARE | Facility: CLINIC | Age: 26
End: 2021-09-23
Payer: COMMERCIAL

## 2021-09-23 VITALS
DIASTOLIC BLOOD PRESSURE: 60 MMHG | SYSTOLIC BLOOD PRESSURE: 98 MMHG | WEIGHT: 127 LBS | RESPIRATION RATE: 16 BRPM | HEART RATE: 70 BPM | BODY MASS INDEX: 21.8 KG/M2 | OXYGEN SATURATION: 100 % | TEMPERATURE: 97.8 F

## 2021-09-23 DIAGNOSIS — R11.2 VOMITING WITH NAUSEA, NOT INTRACTABLE: Primary | ICD-10-CM

## 2021-09-23 LAB — SL AMB POCT URINE HCG: NEGATIVE

## 2021-09-23 PROCEDURE — U0003 INFECTIOUS AGENT DETECTION BY NUCLEIC ACID (DNA OR RNA); SEVERE ACUTE RESPIRATORY SYNDROME CORONAVIRUS 2 (SARS-COV-2) (CORONAVIRUS DISEASE [COVID-19]), AMPLIFIED PROBE TECHNIQUE, MAKING USE OF HIGH THROUGHPUT TECHNOLOGIES AS DESCRIBED BY CMS-2020-01-R: HCPCS | Performed by: EMERGENCY MEDICINE

## 2021-09-23 PROCEDURE — 99213 OFFICE O/P EST LOW 20 MIN: CPT | Performed by: EMERGENCY MEDICINE

## 2021-09-23 PROCEDURE — U0005 INFEC AGEN DETEC AMPLI PROBE: HCPCS | Performed by: EMERGENCY MEDICINE

## 2021-09-23 PROCEDURE — 81025 URINE PREGNANCY TEST: CPT | Performed by: EMERGENCY MEDICINE

## 2021-09-23 RX ORDER — ONDANSETRON 4 MG/1
4 TABLET, FILM COATED ORAL EVERY 6 HOURS PRN
Qty: 6 TABLET | Refills: 0 | Status: SHIPPED | OUTPATIENT
Start: 2021-09-23 | End: 2021-12-15 | Stop reason: ALTCHOICE

## 2021-09-23 RX ORDER — ONDANSETRON 4 MG/1
4 TABLET, FILM COATED ORAL EVERY 6 HOURS PRN
Qty: 6 TABLET | Refills: 0 | Status: SHIPPED | OUTPATIENT
Start: 2021-09-23 | End: 2021-09-23

## 2021-09-23 RX ORDER — ONDANSETRON 4 MG/1
4 TABLET, ORALLY DISINTEGRATING ORAL ONCE
Status: COMPLETED | OUTPATIENT
Start: 2021-09-23 | End: 2021-09-23

## 2021-09-23 RX ADMIN — ONDANSETRON 4 MG: 4 TABLET, ORALLY DISINTEGRATING ORAL at 14:50

## 2021-09-23 NOTE — PATIENT INSTRUCTIONS
1  Check My Chart for your Covid results  2  Take Zofran as needed for nausea/vomiting  3  F/u with PCP in 3-5 days  4  Proceed to the ER if symptoms get worse    Acute Nausea and Vomiting   WHAT YOU NEED TO KNOW:   Acute nausea and vomiting start suddenly, worsen quickly, and last a short time  DISCHARGE INSTRUCTIONS:   Return to the emergency department if:   · You see blood in your vomit or your bowel movements  · You have sudden, severe pain in your chest and upper abdomen after hard vomiting or retching  · You have swelling in your neck and chest      · You are dizzy, cold, and thirsty and your eyes and mouth are dry  · You are urinating very little or not at all  · You have muscle weakness, leg cramps, and trouble breathing  · Your heart is beating much faster than normal      · You continue to vomit for more than 48 hours  Contact your healthcare provider if:   · You have frequent dry heaves (vomiting but nothing comes out)  · Your nausea and vomiting does not get better or go away after you use medicine  · You have questions or concerns about your condition or treatment  Medicines: You may need any of the following:  · Medicines  may be given to calm your stomach and stop your vomiting  You may also need medicines to help you feel more relaxed or to stop nausea and vomiting caused by motion sickness  · Gastrointestinal stimulants  are used to help empty your stomach and bowels  This may help decrease nausea and vomiting  · Take your medicine as directed  Contact your healthcare provider if you think your medicine is not helping or if you have side effects  Tell him or her if you are allergic to any medicine  Keep a list of the medicines, vitamins, and herbs you take  Include the amounts, and when and why you take them  Bring the list or the pill bottles to follow-up visits  Carry your medicine list with you in case of an emergency      Prevent or manage acute nausea and vomiting:   · Do not drink alcohol  Alcohol may upset or irritate your stomach  Too much alcohol can also cause acute nausea and vomiting  · Control stress  Headaches due to stress may cause nausea and vomiting  Find ways to relax and manage your stress  Get more rest and sleep  · Drink more liquids as directed  Vomiting can lead to dehydration  It is important to drink more liquids to help replace lost body fluids  Ask your healthcare provider how much liquid to drink each day and which liquids are best for you  Your provider may recommend that you drink an oral rehydration solution (ORS)  ORS contains water, salts, and sugar that are needed to replace the lost body fluids  Ask what kind of ORS to use, how much to drink, and where to get it  · Eat smaller meals, more often  Eat small amounts of food every 2 to 3 hours, even if you are not hungry  Food in your stomach may decrease your nausea  · Talk to your healthcare provider before you take over-the-counter (OTC) medicines  These medicines can cause serious problems if you use certain other medicines, or you have a medical condition  You may have problems if you use too much or use them for longer than the label says  Follow directions on the label carefully  Follow up with your healthcare provider as directed:  Write down your questions so you remember to ask them during your follow-up visits  © Copyright [employer] 2021 Information is for End User's use only and may not be sold, redistributed or otherwise used for commercial purposes  All illustrations and images included in CareNotes® are the copyrighted property of A D A M [employer] Inc  or [employer]avi Tejada   The above information is an  only  It is not intended as medical advice for individual conditions or treatments  Talk to your doctor, nurse or pharmacist before following any medical regimen to see if it is safe and effective for you

## 2021-09-23 NOTE — PROGRESS NOTES
[facility]        NAME: Indiana Grider is a 30 y o  female  : 1995    MRN: [id number]  DATE: 2021  TIME: 2:57 PM    Assessment and Plan   Vomiting with nausea, not intractable [R11 2]  1  Vomiting with nausea, not intractable  Novel Coronavirus (Covid-19),PCR [facility] - Office Collection    POCT urine HCG    ondansetron (ZOFRAN-ODT) dispersible tablet 4 mg    ondansetron (ZOFRAN) 4 mg tablet    DISCONTINUED: ondansetron (ZOFRAN) 4 mg tablet         Patient Instructions   Patient Instructions   1  Check My Chart for your Covid results  2  Take Zofran as needed for nausea/vomiting  3  F/u with PCP in 3-5 days  4  Proceed to the ER if symptoms get worse    Acute Nausea and Vomiting   WHAT YOU NEED TO KNOW:   Acute nausea and vomiting start suddenly, worsen quickly, and last a short time  DISCHARGE INSTRUCTIONS:   Return to the emergency department if:   · You see blood in your vomit or your bowel movements  · You have sudden, severe pain in your chest and upper abdomen after hard vomiting or retching  · You have swelling in your neck and chest      · You are dizzy, cold, and thirsty and your eyes and mouth are dry  · You are urinating very little or not at all  · You have muscle weakness, leg cramps, and trouble breathing  · Your heart is beating much faster than normal      · You continue to vomit for more than 48 hours  Contact your healthcare provider if:   · You have frequent dry heaves (vomiting but nothing comes out)  · Your nausea and vomiting does not get better or go away after you use medicine  · You have questions or concerns about your condition or treatment  Medicines: You may need any of the following:  · Medicines  may be given to calm your stomach and stop your vomiting  You may also need medicines to help you feel more relaxed or to stop nausea and vomiting caused by motion sickness      · Gastrointestinal stimulants  are used to help empty your stomach and bowels  This may help decrease nausea and vomiting  · Take your medicine as directed  Contact your healthcare provider if you think your medicine is not helping or if you have side effects  Tell him or her if you are allergic to any medicine  Keep a list of the medicines, vitamins, and herbs you take  Include the amounts, and when and why you take them  Bring the list or the pill bottles to follow-up visits  Carry your medicine list with you in case of an emergency  Prevent or manage acute nausea and vomiting:   · Do not drink alcohol  Alcohol may upset or irritate your stomach  Too much alcohol can also cause acute nausea and vomiting  · Control stress  Headaches due to stress may cause nausea and vomiting  Find ways to relax and manage your stress  Get more rest and sleep  · Drink more liquids as directed  Vomiting can lead to dehydration  It is important to drink more liquids to help replace lost body fluids  Ask your healthcare provider how much liquid to drink each day and which liquids are best for you  Your provider may recommend that you drink an oral rehydration solution (ORS)  ORS contains water, salts, and sugar that are needed to replace the lost body fluids  Ask what kind of ORS to use, how much to drink, and where to get it  · Eat smaller meals, more often  Eat small amounts of food every 2 to 3 hours, even if you are not hungry  Food in your stomach may decrease your nausea  · Talk to your healthcare provider before you take over-the-counter (OTC) medicines  These medicines can cause serious problems if you use certain other medicines, or you have a medical condition  You may have problems if you use too much or use them for longer than the label says  Follow directions on the label carefully  Follow up with your healthcare provider as directed:  Write down your questions so you remember to ask them during your follow-up visits    © Copyright [employer] 2021 Information is for [employer] use only and may not be sold, redistributed or otherwise used for commercial purposes  All illustrations and images included in CareNotes® are the copyrighted property of A D A M , Inc  or Xavi Reed  The above information is an  only  It is not intended as medical advice for individual conditions or treatments  Talk to your doctor, nurse or pharmacist before following any medical regimen to see if it is safe and effective for you  Follow up with PCP in 3-5 days  Proceed to  ER if symptoms worsen  Chief Complaint     Chief Complaint   Patient presents with    Vomiting     vomited 3 times two days ago also had the chills and headache   Cold Like Symptoms     states last night she developed some chest congestion  No new cough or runny nose  No fevers  Would like a Covid swab  History of Present Illness       41-year-old On license of [facility] [employer] female with a chief complaint of nausea and vomiting over the past 3 days  Patient states she has had menses and 1 time prior had the same symptoms with it however patient is a  and travels from home to home and would like to be tested for COVID  Review of Systems   Review of Systems   Constitutional: Negative for chills and fever  HENT: Negative for congestion and rhinorrhea  Eyes: Negative for discharge and visual disturbance  Respiratory: Negative for shortness of breath and wheezing  Cardiovascular: Negative for chest pain and palpitations  Gastrointestinal: Positive for nausea and vomiting  Negative for abdominal pain  Endocrine: Negative for polydipsia and polyuria  Genitourinary: Negative for dysuria and hematuria  Musculoskeletal: Negative for arthralgias, gait problem and neck stiffness  Skin: Negative for rash and wound  Neurological: Negative for dizziness and headaches  Psychiatric/Behavioral: Negative for confusion and suicidal ideas  Current Medications       Current Outpatient Medications:     cetirizine (ZyrTEC) 10 mg tablet, Take 10 mg by mouth daily (Patient not taking: Reported on 9/3/2021), Disp: , Rfl:     naproxen (NAPROSYN) 500 mg tablet, Take 1 tablet (500 mg total) by mouth 2 (two) times a day as needed for moderate pain for up to 20 doses (Patient not taking: Reported on 2020), Disp: 20 tablet, Rfl: 0    ondansetron (ZOFRAN) 4 mg tablet, Take 1 tablet (4 mg total) by mouth every 6 (six) hours as needed for nausea or vomiting for up to 6 doses, Disp: 6 tablet, Rfl: 0    terconazole (TERAZOL 7) 0 4 % vaginal cream, Insert 1 applicator into the vagina daily at bedtime, Disp: 45 g, Rfl: 1  No current facility-administered medications for this visit  Current Allergies     Allergies as of 2021    (No Known Allergies)            The following portions of the patient's history were reviewed and updated as appropriate: allergies, current medications, past family history, past medical history, past social history, past surgical history and problem list      Past Medical History:   Diagnosis Date    Bacterial vaginitis     Carrier of ureaplasma urealyticum     Medical      Migraine     Urinary tract infection     Yeast vaginitis        History reviewed  No pertinent surgical history  Family History   Problem Relation Age of Onset    Obesity Mother     Hypertension Family     Diabetes Family     Osteoarthritis Family     Depression Family     Thyroid disease Family          Medications have been verified  Objective   BP 98/60   Pulse 70   Temp 97 8 °F (36 6 °C) (Temporal)   Resp 16   Wt 57 6 kg (127 lb)   LMP  (Within Days)   SpO2 100%   BMI 21 80 kg/m²        Physical Exam     Physical Exam  Vitals and nursing note reviewed  Constitutional:       General: She is not in acute distress  Appearance: She is well-developed  She is not ill-appearing, toxic-appearing or diaphoretic  Comments: 31 yo w female with cc of nausea/vomiting sitting on the stretcher in no acute distress  HENT:      Head: Normocephalic and atraumatic  Mouth/Throat:      Pharynx: Oropharynx is clear  Eyes:      General: No scleral icterus  Extraocular Movements: Extraocular movements intact  Pupils: Pupils are equal, round, and reactive to light  Cardiovascular:      Rate and Rhythm: Normal rate  Heart sounds: Normal heart sounds  Pulmonary:      Effort: Pulmonary effort is normal  No respiratory distress  Breath sounds: Normal breath sounds  No stridor  No wheezing, rhonchi or rales  Chest:      Chest wall: No tenderness  Abdominal:      General: Abdomen is flat  Bowel sounds are normal  There is no distension  Palpations: Abdomen is soft  There is no shifting dullness, hepatomegaly, splenomegaly or mass  Tenderness: There is no abdominal tenderness  There is no right CVA tenderness, left CVA tenderness or guarding  Negative signs include Patrick's sign and McBurney's sign  Hernia: No hernia is present  Comments: Mild midepigastric tenderness   Skin:     General: Skin is warm and dry  Coloration: Skin is not cyanotic, jaundiced, mottled or pale  Findings: No erythema  Neurological:      General: No focal deficit present  Mental Status: She is alert and oriented to person, place, and time  Psychiatric:         Mood and Affect: Mood normal         Patient's pregnancy test was negative  Patient was given high doses of Zofran here in Urgent Care and I wrote a Rx for 6 more doses if needed

## 2021-09-24 LAB — SARS-COV-2 RNA RESP QL NAA+PROBE: NEGATIVE

## 2021-12-02 ENCOUNTER — TELEPHONE (OUTPATIENT)
Dept: FAMILY MEDICINE CLINIC | Facility: CLINIC | Age: 26
End: 2021-12-02

## 2021-12-03 ENCOUNTER — TRANSCRIBE ORDERS (OUTPATIENT)
Dept: FAMILY MEDICINE CLINIC | Facility: CLINIC | Age: 26
End: 2021-12-03

## 2021-12-03 DIAGNOSIS — Z20.822 COVID-19 RULED OUT: Primary | ICD-10-CM

## 2021-12-03 PROCEDURE — U0003 INFECTIOUS AGENT DETECTION BY NUCLEIC ACID (DNA OR RNA); SEVERE ACUTE RESPIRATORY SYNDROME CORONAVIRUS 2 (SARS-COV-2) (CORONAVIRUS DISEASE [COVID-19]), AMPLIFIED PROBE TECHNIQUE, MAKING USE OF HIGH THROUGHPUT TECHNOLOGIES AS DESCRIBED BY CMS-2020-01-R: HCPCS | Performed by: FAMILY MEDICINE

## 2021-12-03 PROCEDURE — U0005 INFEC AGEN DETEC AMPLI PROBE: HCPCS | Performed by: FAMILY MEDICINE

## 2021-12-04 LAB — SARS-COV-2 RNA RESP QL NAA+PROBE: POSITIVE

## 2021-12-07 ENCOUNTER — TELEMEDICINE (OUTPATIENT)
Dept: FAMILY MEDICINE CLINIC | Facility: CLINIC | Age: 26
End: 2021-12-07
Payer: COMMERCIAL

## 2021-12-07 VITALS
BODY MASS INDEX: 20.83 KG/M2 | DIASTOLIC BLOOD PRESSURE: 70 MMHG | HEIGHT: 65 IN | WEIGHT: 125 LBS | TEMPERATURE: 98 F | SYSTOLIC BLOOD PRESSURE: 110 MMHG

## 2021-12-07 DIAGNOSIS — U07.1 COVID-19 VIRUS INFECTION: Primary | ICD-10-CM

## 2021-12-07 PROCEDURE — 99203 OFFICE O/P NEW LOW 30 MIN: CPT | Performed by: FAMILY MEDICINE

## 2021-12-15 ENCOUNTER — TELEMEDICINE (OUTPATIENT)
Dept: FAMILY MEDICINE CLINIC | Facility: CLINIC | Age: 26
End: 2021-12-15
Payer: COMMERCIAL

## 2021-12-15 DIAGNOSIS — U07.1 COVID-19 VIRUS INFECTION: Primary | ICD-10-CM

## 2021-12-15 PROBLEM — N89.8 VAGINAL DISCHARGE: Status: RESOLVED | Noted: 2020-06-01 | Resolved: 2021-12-15

## 2021-12-15 PROBLEM — Z71.85 HPV VACCINE COUNSELING: Status: RESOLVED | Noted: 2021-09-03 | Resolved: 2021-12-15

## 2021-12-15 PROBLEM — N89.8 VAGINAL ODOR: Status: RESOLVED | Noted: 2021-01-04 | Resolved: 2021-12-15

## 2021-12-15 PROCEDURE — G2012 BRIEF CHECK IN BY MD/QHP: HCPCS | Performed by: FAMILY MEDICINE

## 2022-02-02 ENCOUNTER — TELEPHONE (OUTPATIENT)
Dept: OBGYN CLINIC | Facility: CLINIC | Age: 27
End: 2022-02-02

## 2022-02-02 DIAGNOSIS — Z11.3 SCREENING FOR STD (SEXUALLY TRANSMITTED DISEASE): Primary | ICD-10-CM

## 2022-02-02 NOTE — TELEPHONE ENCOUNTER
Spoke to pt and what she is asking for is if we can add a urine GC/CT again  She still has the STB serum orders in her chart but when she goes to lab today she wanted to to the urine sample also  Told her I would run by Christiano Mcmillan make sure its ok and then mychart her and let her know

## 2022-02-02 NOTE — TELEPHONE ENCOUNTER
Pt called asking if there is a way that other test can be add to her lab work  She wants to be test for [facility] - CAH, STD, "everything "     Pt last seen on 9/3/2021 by Fede     I explained the pt she will need to scheduled an appointment to talk to her provider but she just one me to send a message  Please advise! Thanks!

## 2022-02-15 ENCOUNTER — APPOINTMENT (OUTPATIENT)
Dept: LAB | Facility: CLINIC | Age: 27
End: 2022-02-15
Payer: COMMERCIAL

## 2022-02-15 DIAGNOSIS — Z11.3 SCREENING FOR STD (SEXUALLY TRANSMITTED DISEASE): ICD-10-CM

## 2022-02-15 LAB
HBV SURFACE AG SER QL: NORMAL
HCV AB SER QL: NORMAL

## 2022-02-15 PROCEDURE — 87491 CHLMYD TRACH DNA AMP PROBE: CPT

## 2022-02-15 PROCEDURE — 86592 SYPHILIS TEST NON-TREP QUAL: CPT

## 2022-02-15 PROCEDURE — 87389 HIV-1 AG W/HIV-1&-2 AB AG IA: CPT

## 2022-02-15 PROCEDURE — 86803 HEPATITIS C AB TEST: CPT

## 2022-02-15 PROCEDURE — 87340 HEPATITIS B SURFACE AG IA: CPT

## 2022-02-15 PROCEDURE — 36415 COLL VENOUS BLD VENIPUNCTURE: CPT

## 2022-02-15 PROCEDURE — 87591 N.GONORRHOEAE DNA AMP PROB: CPT

## 2022-02-16 LAB
C TRACH DNA SPEC QL NAA+PROBE: NEGATIVE
HIV 1+2 AB+HIV1 P24 AG SERPL QL IA: NORMAL
N GONORRHOEA DNA SPEC QL NAA+PROBE: NEGATIVE
RPR SER QL: NORMAL

## 2022-03-03 ENCOUNTER — TELEPHONE (OUTPATIENT)
Dept: OBGYN CLINIC | Facility: MEDICAL CENTER | Age: 27
End: 2022-03-03

## 2022-03-03 ENCOUNTER — OFFICE VISIT (OUTPATIENT)
Dept: OBGYN CLINIC | Facility: MEDICAL CENTER | Age: 27
End: 2022-03-03
Payer: COMMERCIAL

## 2022-03-03 VITALS
SYSTOLIC BLOOD PRESSURE: 110 MMHG | DIASTOLIC BLOOD PRESSURE: 80 MMHG | BODY MASS INDEX: 19.66 KG/M2 | HEIGHT: 65 IN | WEIGHT: 118 LBS

## 2022-03-03 DIAGNOSIS — N76.0 BV (BACTERIAL VAGINOSIS): Primary | ICD-10-CM

## 2022-03-03 DIAGNOSIS — Z72.51 UNPROTECTED SEX: ICD-10-CM

## 2022-03-03 DIAGNOSIS — B96.89 BV (BACTERIAL VAGINOSIS): Primary | ICD-10-CM

## 2022-03-03 LAB
BV WHIFF TEST VAG QL: ABNORMAL
CLUE CELLS SPEC QL WET PREP: ABNORMAL
PH SMN: 5 [PH]
SL AMB POCT URINE HCG: NORMAL
T VAGINALIS VAG QL WET PREP: ABNORMAL
YEAST VAG QL WET PREP: ABNORMAL

## 2022-03-03 PROCEDURE — 87210 SMEAR WET MOUNT SALINE/INK: CPT | Performed by: OBSTETRICS & GYNECOLOGY

## 2022-03-03 PROCEDURE — 81025 URINE PREGNANCY TEST: CPT | Performed by: OBSTETRICS & GYNECOLOGY

## 2022-03-03 PROCEDURE — 81514 NFCT DS BV&VAGINITIS DNA ALG: CPT | Performed by: OBSTETRICS & GYNECOLOGY

## 2022-03-03 PROCEDURE — 99213 OFFICE O/P EST LOW 20 MIN: CPT | Performed by: OBSTETRICS & GYNECOLOGY

## 2022-03-03 RX ORDER — METRONIDAZOLE 500 MG/1
500 TABLET ORAL EVERY 12 HOURS SCHEDULED
Qty: 14 TABLET | Refills: 0 | Status: SHIPPED | OUTPATIENT
Start: 2022-03-03 | End: 2022-03-10

## 2022-03-03 NOTE — PROGRESS NOTES
Assessment/Plan:    No problem-specific Assessment & Plan notes found for this encounter  Diagnoses and all orders for this visit:    BV (bacterial vaginosis)  -     metroNIDAZOLE (FLAGYL) 500 mg tablet; Take 1 tablet (500 mg total) by mouth every 12 (twelve) hours for 7 days  -     Molecular Vaginal Panel  -     POCT wet mount    Unprotected sex  -     POCT urine HCG          Subjective:      Patient ID: Bria Ozuna is a 27 y o  female  24-year-old AA female presents with complaints vaginal discharge vaginal odor slight itching and irritation  Patient has recently had a new sexual partner  Patient has a history of BV and yeast   Patient did try over-the-counter Monistat with no relief  Patient denies pelvic pain, unusual vaginal bleeding, fevers, or other signs of infection  Patient also requesting pregnancy test  HPV vaccines completed      The following portions of the patient's history were reviewed and updated as appropriate: allergies, current medications, past family history, past medical history, past social history, past surgical history and problem list     Review of Systems   Constitutional: Negative for chills, fatigue and fever  Eyes: Negative for visual disturbance  Respiratory: Negative for cough and shortness of breath  Cardiovascular: Negative for chest pain  Gastrointestinal: Negative for abdominal pain  Genitourinary: Positive for vaginal discharge  Negative for vaginal bleeding  Objective:      /80 (BP Location: Right arm, Patient Position: Sitting, Cuff Size: Large)   Ht 5' 4 5" (1 638 m)   Wt 53 5 kg (118 lb)   LMP 02/03/2022 (Exact Date)   BMI 19 94 kg/m²          Physical Exam  Vitals and nursing note reviewed  Constitutional:       Appearance: Normal appearance  She is normal weight  HENT:      Head: Normocephalic and atraumatic  Eyes:      Conjunctiva/sclera: Conjunctivae normal    Cardiovascular:      Rate and Rhythm: Normal rate  Pulmonary:      Effort: Pulmonary effort is normal    Genitourinary:     General: Normal vulva  Exam position: Lithotomy position  Robbie stage (genital): 5  Labia:         Right: No rash, tenderness, lesion or injury  Left: No rash, tenderness, lesion or injury  Vagina: Vaginal discharge present  Cervix: Normal       Uterus: Normal        Adnexa: Right adnexa normal and left adnexa normal       Comments: Thin milky whitish discharge  Musculoskeletal:         General: Normal range of motion  Cervical back: Normal range of motion  Lymphadenopathy:      Lower Body: No right inguinal adenopathy  No left inguinal adenopathy  Skin:     General: Skin is warm and dry  Neurological:      Mental Status: She is alert  Psychiatric:         Mood and Affect: Mood normal          Behavior: Behavior normal          Thought Content: Thought content normal          Judgment: Judgment normal        instructions reviewed for taking metronidazole  Avoid [place] during treatment avoid alcohol during treatment  See afterVisit summary for further information   follow-up p r n    Will follow-up with molecular panel results

## 2022-03-03 NOTE — PATIENT INSTRUCTIONS
Perineal Hygiene     No soaps or feminine wash to the vulva  Use only water to cleanse, or water with Dove or [employer] if necessary  No lotion to the area  Use only coconut oil for moisture if needed   No douching     Cotton underware, loose fitting clothing  Only perfume-free, dye-free laundry detergent, use a second rinse cycle   Avoid fabric softeners/dryer sheets  Coconut oil as a lubricant (if not using condoms) or another scent-free lubricant (Astroglide, Uberlube) if needed  Partner to avoid the same products as well  Over the counter probiotic to restore vaginal andrew may be helpful as well     You may also look into Boric Acid vaginal suppositories to restore vaginal PH balance for up to 2 weeks as directed on the box   You may not use these if you are pregnant

## 2022-03-03 NOTE — TELEPHONE ENCOUNTER
I called and spoke to pt informing below  Pt did say she may get cycle soon- scheduled her to see you today at 11:40  Pt confirmed she can make appt at that time, leaving soon

## 2022-03-03 NOTE — TELEPHONE ENCOUNTER
Pt called back and informed me she thinks she has a Sinhala and BV- has some irritation, little itching, white, thick discharge and a fishy odor   Please advise

## 2022-03-04 LAB
C GLABRATA DNA VAG QL NAA+PROBE: NEGATIVE
C KRUSEI DNA VAG QL NAA+PROBE: NEGATIVE
CANDIDA SP 6 PNL VAG NAA+PROBE: NEGATIVE
T VAGINALIS DNA VAG QL NAA+PROBE: NEGATIVE
VAGINOSIS/ITIS DNA PNL VAG PROBE+SIG AMP: POSITIVE

## 2022-07-05 ENCOUNTER — VBI (OUTPATIENT)
Dept: ADMINISTRATIVE | Facility: OTHER | Age: 27
End: 2022-07-05

## 2022-07-19 ENCOUNTER — TELEPHONE (OUTPATIENT)
Dept: OBGYN CLINIC | Facility: CLINIC | Age: 27
End: 2022-07-19

## 2022-07-19 DIAGNOSIS — B96.89 BV (BACTERIAL VAGINOSIS): Primary | ICD-10-CM

## 2022-07-19 DIAGNOSIS — N76.0 BV (BACTERIAL VAGINOSIS): Primary | ICD-10-CM

## 2022-07-19 RX ORDER — METRONIDAZOLE 7.5 MG/G
1 GEL VAGINAL
Qty: 45 G | Refills: 0 | Status: SHIPPED | OUTPATIENT
Start: 2022-07-19 | End: 2022-07-24

## 2022-07-19 NOTE — TELEPHONE ENCOUNTER
Pt has symptoms of bacterial vaginosis - would like the gel if we can order that for her  She had cancelled last wk's appt due to her period which is almost over  [facility] on file

## 2022-07-19 NOTE — TELEPHONE ENCOUNTER
pts last ov 3/3  Began 1 week ago with milky disch and fishy odor    Currently has her cycle and had to resched visit to 8/2  Pt req rx and prefers the gel  Pharm jie/dianne

## 2022-07-19 NOTE — PROGRESS NOTES
Patient called in with symptoms of BV  I have seen her in the past for BV scripts sent to pharmacy as requested    Patient has an upcoming appointment

## 2022-10-12 PROBLEM — Z11.3 SCREENING FOR STDS (SEXUALLY TRANSMITTED DISEASES): Status: RESOLVED | Noted: 2021-01-04 | Resolved: 2022-10-12

## 2022-11-02 ENCOUNTER — OFFICE VISIT (OUTPATIENT)
Dept: OBGYN CLINIC | Facility: CLINIC | Age: 27
End: 2022-11-02

## 2022-11-02 VITALS
BODY MASS INDEX: 20.99 KG/M2 | DIASTOLIC BLOOD PRESSURE: 80 MMHG | SYSTOLIC BLOOD PRESSURE: 120 MMHG | WEIGHT: 126 LBS | HEIGHT: 65 IN

## 2022-11-02 DIAGNOSIS — B96.89 BV (BACTERIAL VAGINOSIS): Primary | ICD-10-CM

## 2022-11-02 DIAGNOSIS — N76.0 BV (BACTERIAL VAGINOSIS): Primary | ICD-10-CM

## 2022-11-02 LAB
BV WHIFF TEST VAG QL: ABNORMAL
CLUE CELLS SPEC QL WET PREP: ABNORMAL
PH SMN: 5.5 [PH]
T VAGINALIS VAG QL WET PREP: ABNORMAL
YEAST VAG QL WET PREP: ABNORMAL

## 2022-11-02 RX ORDER — METRONIDAZOLE 7.5 MG/G
1 GEL VAGINAL
Qty: 45 G | Refills: 0 | Status: SHIPPED | OUTPATIENT
Start: 2022-11-02 | End: 2022-11-07

## 2022-11-02 RX ORDER — FLUCONAZOLE 150 MG/1
TABLET ORAL
Qty: 2 TABLET | Refills: 0 | Status: SHIPPED | OUTPATIENT
Start: 2022-11-02 | End: 2022-11-05

## 2022-11-02 NOTE — PATIENT INSTRUCTIONS
Perineal Hygiene      Your vaginal naturally takes care of its self, it is a self washing system, the less you mess the healthier it will be     No soaps or feminine wash to the vulva, these products can cause dermitis, bacterial infections and other vulvar problems  Use only water to cleanse, or water with Dove or [employer] if necessary  No scented lotions or products are advised in or near your vulva  Use only coconut oil for moisture if needed  No douching this may cause imbalance in your vaginal PH and further issues  If you wear panty liners, you may apply a thin coating of Vaseline, A&D ointment or coconut oil to the vulvar tissues as a skin barrier     Cotton underware, loose fitting clothing  Only perfume-free, dye-free laundry detergent, use a second rinse cycle   Avoid fabric softeners/dryer sheets  Partner should avoid the same products as well  Over the counter probiotic to restore vaginal andrew may be helpful as well, take daily  You may also look into Boric Acid vaginal suppositories to restore vaginal PH balance for up to 2 weeks as directed on the box  You may not use these if you are pregnant      For vaginal dryness: You may use:     Coconut oil (organic, pure, unscented) as needed for moisture or lubrication  ( Do not use if allergic)       Replens moisture restore external comfort gel daily ( use as directed on the box)        Replens long lasting vaginal moisturizer  ( use as directed on the box)         For Vaginal Lubrication:          You may use:     Coconut oil (organic, pure, unscented) as a lubricant or another scent-free lubricant (Astroglide, Uberlube) if needed    Do not use coconut oil or silicone if using a condom as this may break down the integrity of the condom and cause an unplanned pregnancy              Do not use coconut oil if allergic               Replens silky smooth lubricant, premium silicone based lubricant for intercourse  ( use as directed, a small amount will provide an enhanced natural feeling)     Any premium over the counter vaginal lubricant water or silicone based  Silicone based will have more staying power  Bacterial Vaginosis   AMBULATORY CARE:   Bacterial vaginosis  is an infection in the vagina  It may cause vaginitis (irritation and inflammation of the vagina)  The cause is not known  Bacteria normally found in the vagina are imbalanced  Your risk increases if you are sexually active, you use a douche, or you have an intrauterine device (IUD)  Common signs and symptoms of bacterial vaginosis:   White, gray, or yellow vaginal discharge    Vaginal discharge that smells like fish    Itching or burning around the outside of your vagina    Call your doctor or gynecologist if:   Your symptoms come back or do not improve with treatment  You have vaginal bleeding that is not your monthly period  You have questions or concerns about your condition or care  Antibiotics  are given to kill the bacteria  They may be given as a pill or as a cream to put in your vagina  Bacterial vaginosis and pregnancy:  If you have bacterial vaginosis during pregnancy, your baby may be born early or have a low birth weight  Your healthcare provider may recommend testing for bacterial vaginosis before or during your pregnancy  He or she will talk to you about your risk for premature delivery, and make sure you know the benefits and risks of testing  Prevent bacterial vaginosis:   Keep your vaginal area clean and dry  Wear underwear and pantyhose with a cotton crotch  Wipe from front to back after you urinate or have a bowel movement  After you bathe, rinse soap from your vaginal area to decrease your risk for irritation  Do not use products that cause irritation  Always use unscented tampons or sanitary pads  Do not use feminine sprays, powders, or scented tampons   They may cause irritation and increase your risk for vaginosis  Detergents and fabric softeners may also cause irritation  Do not use a douche  This can cause an imbalance in healthy vaginal bacteria  Use latex condoms during sex  This helps prevent another infection and keeps your partner from getting the infection  Follow up with your doctor or gynecologist as directed: Bacterial vaginosis increases the risk for several health problems, such as pelvic inflammatory disease (PID) or sexually transmitted infections  Work with your healthcare providers to schedule regular appointments to check for health problems  Write down your questions so you remember to ask them during your visits  © Copyright [employer] 2022 Information is for End User's use only and may not be sold, redistributed or otherwise used for commercial purposes  All illustrations and images included in CareNotes® are the copyrighted property of [employer] M [employer]  or [employer]  The above information is an  only  It is not intended as medical advice for individual conditions or treatments  Talk to your doctor, nurse or pharmacist before following any medical regimen to see if it is safe and effective for you

## 2023-02-16 ENCOUNTER — VBI (OUTPATIENT)
Dept: ADMINISTRATIVE | Facility: OTHER | Age: 28
End: 2023-02-16

## 2023-05-03 ENCOUNTER — VBI (OUTPATIENT)
Dept: ADMINISTRATIVE | Facility: OTHER | Age: 28
End: 2023-05-03

## 2024-07-01 ENCOUNTER — VBI (OUTPATIENT)
Dept: ADMINISTRATIVE | Facility: OTHER | Age: 29
End: 2024-07-01

## 2024-07-01 NOTE — TELEPHONE ENCOUNTER
07/01/24 12:48 PM     Chart reviewed for Pap Smear (HPV) aka Cervical Cancer Screening ; nothing is submitted to the patient's insurance at this time.     Yashira Henderson   [facility]

## 2024-08-14 ENCOUNTER — APPOINTMENT (OUTPATIENT)
Dept: PHYSICAL THERAPY | Facility: CLINIC | Age: 29
End: 2024-08-14

## 2024-08-14 PROCEDURE — 97530 THERAPEUTIC ACTIVITIES: CPT | Performed by: PHYSICAL THERAPIST

## 2024-12-11 ENCOUNTER — HOSPITAL ENCOUNTER (EMERGENCY)
Facility: HOSPITAL | Age: 29
Discharge: HOME | End: 2024-12-11
Attending: EMERGENCY MEDICINE
Payer: COMMERCIAL

## 2024-12-11 ENCOUNTER — APPOINTMENT (EMERGENCY)
Dept: RADIOLOGY | Facility: HOSPITAL | Age: 29
End: 2024-12-11
Payer: COMMERCIAL

## 2024-12-11 VITALS
OXYGEN SATURATION: 95 % | TEMPERATURE: 98.7 F | SYSTOLIC BLOOD PRESSURE: 140 MMHG | DIASTOLIC BLOOD PRESSURE: 88 MMHG | RESPIRATION RATE: 19 BRPM | HEART RATE: 67 BPM

## 2024-12-11 DIAGNOSIS — R07.9 CHEST PAIN: Primary | ICD-10-CM

## 2024-12-11 DIAGNOSIS — E87.6 HYPOKALEMIA: ICD-10-CM

## 2024-12-11 DIAGNOSIS — R11.2 NAUSEA & VOMITING: ICD-10-CM

## 2024-12-11 LAB
ANION GAP SERPL CALCULATED.3IONS-SCNC: 8 MMOL/L (ref 4–13)
ATRIAL RATE: 53 BPM
BASOPHILS # BLD AUTO: 0.05 THOUSANDS/ÂΜL (ref 0–0.1)
BASOPHILS NFR BLD AUTO: 1 % (ref 0–1)
BUN SERPL-MCNC: 8 MG/DL (ref 5–25)
CALCIUM SERPL-MCNC: 9.6 MG/DL (ref 8.4–10.2)
CARDIAC TROPONIN I PNL SERPL HS: 3 NG/L (ref ?–50)
CHLORIDE SERPL-SCNC: 101 MMOL/L (ref 96–108)
CO2 SERPL-SCNC: 28 MMOL/L (ref 21–32)
CREAT SERPL-MCNC: 0.71 MG/DL (ref 0.6–1.3)
EOSINOPHIL # BLD AUTO: 0.27 THOUSAND/ÂΜL (ref 0–0.61)
EOSINOPHIL NFR BLD AUTO: 5 % (ref 0–6)
ERYTHROCYTE [DISTWIDTH] IN BLOOD BY AUTOMATED COUNT: 13.7 % (ref 11.6–15.1)
GFR SERPL CREATININE-BSD FRML MDRD: 113 ML/MIN/1.73SQ M
GLUCOSE SERPL-MCNC: 100 MG/DL (ref 65–140)
HCT VFR BLD AUTO: 41 % (ref 34.8–46.1)
HGB BLD-MCNC: 13.3 G/DL (ref 11.5–15.4)
IMM GRANULOCYTES # BLD AUTO: 0 THOUSAND/UL (ref 0–0.2)
IMM GRANULOCYTES NFR BLD AUTO: 0 % (ref 0–2)
LYMPHOCYTES # BLD AUTO: 2.63 THOUSANDS/ÂΜL (ref 0.6–4.47)
LYMPHOCYTES NFR BLD AUTO: 51 % (ref 14–44)
MCH RBC QN AUTO: 29.2 PG (ref 26.8–34.3)
MCHC RBC AUTO-ENTMCNC: 32.4 G/DL (ref 31.4–37.4)
MCV RBC AUTO: 90 FL (ref 82–98)
MONOCYTES # BLD AUTO: 0.49 THOUSAND/ÂΜL (ref 0.17–1.22)
MONOCYTES NFR BLD AUTO: 10 % (ref 4–12)
NEUTROPHILS # BLD AUTO: 1.72 THOUSANDS/ÂΜL (ref 1.85–7.62)
NEUTS SEG NFR BLD AUTO: 33 % (ref 43–75)
NRBC BLD AUTO-RTO: 0 /100 WBCS
P AXIS: 66 DEGREES
PLATELET # BLD AUTO: 305 THOUSANDS/UL (ref 149–390)
PMV BLD AUTO: 8.9 FL (ref 8.9–12.7)
POTASSIUM SERPL-SCNC: 3 MMOL/L (ref 3.5–5.3)
PR INTERVAL: 146 MS
QRS AXIS: 71 DEGREES
QRSD INTERVAL: 80 MS
QT INTERVAL: 432 MS
QTC INTERVAL: 405 MS
RBC # BLD AUTO: 4.56 MILLION/UL (ref 3.81–5.12)
SODIUM SERPL-SCNC: 137 MMOL/L (ref 135–147)
T WAVE AXIS: 51 DEGREES
VENTRICULAR RATE: 53 BPM
WBC # BLD AUTO: 5.16 THOUSAND/UL (ref 4.31–10.16)

## 2024-12-11 PROCEDURE — 71045 X-RAY EXAM CHEST 1 VIEW: CPT

## 2024-12-11 PROCEDURE — 80048 BASIC METABOLIC PNL TOTAL CA: CPT | Performed by: EMERGENCY MEDICINE

## 2024-12-11 PROCEDURE — 36415 COLL VENOUS BLD VENIPUNCTURE: CPT | Performed by: EMERGENCY MEDICINE

## 2024-12-11 PROCEDURE — 93005 ELECTROCARDIOGRAM TRACING: CPT

## 2024-12-11 PROCEDURE — 85025 COMPLETE CBC W/AUTO DIFF WBC: CPT | Performed by: EMERGENCY MEDICINE

## 2024-12-11 PROCEDURE — 84484 ASSAY OF TROPONIN QUANT: CPT | Performed by: EMERGENCY MEDICINE

## 2024-12-11 PROCEDURE — 99284 EMERGENCY DEPT VISIT MOD MDM: CPT | Performed by: EMERGENCY MEDICINE

## 2024-12-11 PROCEDURE — 93010 ELECTROCARDIOGRAM REPORT: CPT | Performed by: INTERNAL MEDICINE

## 2024-12-11 PROCEDURE — 99285 EMERGENCY DEPT VISIT HI MDM: CPT

## 2024-12-11 PROCEDURE — 96374 THER/PROPH/DIAG INJ IV PUSH: CPT

## 2024-12-11 RX ORDER — MAGNESIUM HYDROXIDE/ALUMINUM HYDROXICE/SIMETHICONE 120; 1200; 1200 MG/30ML; MG/30ML; MG/30ML
30 SUSPENSION ORAL ONCE
Status: COMPLETED | OUTPATIENT
Start: 2024-12-11 | End: 2024-12-11

## 2024-12-11 RX ORDER — FAMOTIDINE 20 MG/1
20 TABLET, FILM COATED ORAL ONCE
Status: COMPLETED | OUTPATIENT
Start: 2024-12-11 | End: 2024-12-11

## 2024-12-11 RX ORDER — KETOROLAC TROMETHAMINE 30 MG/ML
15 INJECTION, SOLUTION INTRAMUSCULAR; INTRAVENOUS ONCE
Status: COMPLETED | OUTPATIENT
Start: 2024-12-11 | End: 2024-12-11

## 2024-12-11 RX ORDER — POTASSIUM CHLORIDE 1500 MG/1
40 TABLET, EXTENDED RELEASE ORAL ONCE
Status: COMPLETED | OUTPATIENT
Start: 2024-12-11 | End: 2024-12-11

## 2024-12-11 RX ADMIN — KETOROLAC TROMETHAMINE 15 MG: 30 INJECTION, SOLUTION INTRAMUSCULAR at 13:35

## 2024-12-11 RX ADMIN — FAMOTIDINE 20 MG: 20 TABLET, FILM COATED ORAL at 13:34

## 2024-12-11 RX ADMIN — ALUMINUM HYDROXIDE, MAGNESIUM HYDROXIDE, AND DIMETHICONE 30 ML: 200; 20; 200 SUSPENSION ORAL at 13:34

## 2024-12-11 RX ADMIN — POTASSIUM CHLORIDE 40 MEQ: 1500 TABLET, EXTENDED RELEASE ORAL at 13:55

## 2024-12-11 NOTE — DISCHARGE INSTRUCTIONS
Please follow up PCP.  Recommend brat diet or clear liquid diet for the next couple of days.  Can consider Mylanta or Pepcid as needed for pain.  Recommend tylenol 650 mg and ibuprofen 600 mg every 6 hours as needed for pain. Please return for severe chest pain, significant shortness of breath, severely worsening symptoms, or any other concerning signs or symptoms. Please refer to the following documents for additional instructions and return precautions.

## 2024-12-11 NOTE — Clinical Note
Elena Abbasi was seen and treated in our emergency department on 12/11/2024.                Diagnosis: chest pain    Elena  may return to work on return date.    She may return on this date: 12/12/2024         If you have any questions or concerns, please don't hesitate to call.      Raúl Rangel MD    ______________________________           _______________          _______________  Hospital Representative                              Date                                Time

## 2024-12-11 NOTE — ED PROVIDER NOTES
Time reflects when diagnosis was documented in both MDM as applicable and the Disposition within this note       Time User Action Codes Description Comment    12/11/2024  1:35 PM Erne, Raúl Add [R07.9] Chest pain     12/11/2024  1:35 PM Erne, Raúl Add [R11.2] Nausea & vomiting     12/11/2024  1:52 PM Raúl Rangel Add [E87.6] Hypokalemia           ED Disposition       ED Disposition   Discharge    Condition   Stable    Date/Time   Wed Dec 11, 2024  1:52 PM    Comment   Elena Abbasi discharge to home/self care.                   Assessment & Plan       Medical Decision Making  29-year-old female no significant reported past history presenting with chest pain.  Atypical chest pain beginning after nausea vomiting.  Possible GI component.  Plan for cardiac evaluation including EKG troponin plus chest x-ray.  Basic labs.  Reassess.      EKG interpreted by me with sinus bradycardia nonspecific ST abnormality.  Labs interpreted by me notable for potassium of 3 likely related to recent nausea vomiting.  Repleted with p.o. potassium.  Chest x-ray interpreted by me without significant acute cardiopulmonary process.  Symptoms improving with medication.  Chest pain precautions.  Dietary recommendations. Discussed results and recommendations. Advised follow up PCP. Medication recommendations. Given instructions and return precautions. Patient/family at bedside acknowledged understanding of all written and verbal instructions and return precautions. Discharged.     Amount and/or Complexity of Data Reviewed  Labs: ordered.  Radiology: ordered.    Risk  OTC drugs.  Prescription drug management.             Medications   ketorolac (TORADOL) injection 15 mg (15 mg Intravenous Given 12/11/24 1335)   famotidine (PEPCID) tablet 20 mg (20 mg Oral Given 12/11/24 1334)   aluminum-magnesium hydroxide-simethicone (MAALOX) oral suspension 30 mL (30 mL Oral Given 12/11/24 1334)   potassium chloride (Klor-Con M20) CR tablet 40 mEq (40 mEq  Oral Given 24 1355)       ED Risk Strat Scores   HEART Risk Score      Flowsheet Row Most Recent Value   Heart Score Risk Calculator    History 0 Filed at: 2024 1354   ECG 1 Filed at: 2024 1354   Age 0 Filed at: 2024 1354   Risk Factors 0 Filed at: 2024 1354   Troponin 0 Filed at: 2024 1354   HEART Score 1 Filed at: 2024 1354                                                   History of Present Illness       Chief Complaint   Patient presents with    Shortness of Breath     Patient reports cough, shortness of breath, nausea and vomiting starting this past Monday that has continued today. Patient Denies any fevers, no meds taken today prior to arrival.        Past Medical History:   Diagnosis Date    Bacterial vaginitis     Carrier of ureaplasma urealyticum     Medical      Migraine     Urinary tract infection     Yeast vaginitis       History reviewed. No pertinent surgical history.   Family History   Problem Relation Age of Onset    Obesity Mother     Hypertension Family     Diabetes Family     Osteoarthritis Family     Depression Family     Thyroid disease Family       Social History     Tobacco Use    Smoking status: Never    Smokeless tobacco: Never   Vaping Use    Vaping status: Never Used   Substance Use Topics    Alcohol use: Yes     Comment: social    Drug use: Yes     Types: Marijuana      E-Cigarette/Vaping    E-Cigarette Use Never User       E-Cigarette/Vaping Substances    Nicotine No     THC No     CBD No     Flavoring No     Other No     Unknown No       I have reviewed and agree with the history as documented.     29-year-old female no significant reported past history presenting with chest pain.  Patient reports nausea vomiting during the day on Monday which has since resolved.  Patient reports insidious onset of chest pain which has remained constant.  Described as a burning pain.  Denies any current nausea vomiting.  Denies any abdominal pain.   Denies any shortness of breath.  Denies any association with exertion, diaphoresis, lightheadedness/syncope, radiation.  Denies any other complaints.  Chart reviewed.    Past Medical History:  No date: Bacterial vaginitis  No date: Carrier of ureaplasma urealyticum  No date: Medical   No date: Migraine  No date: Urinary tract infection  No date: Yeast vaginitis  Family History: non-contributory  Social History          Review of Systems   Constitutional:  Negative for appetite change, chills, diaphoresis, fever and unexpected weight change.   HENT:  Negative for congestion and rhinorrhea.    Eyes:  Negative for photophobia and visual disturbance.   Respiratory:  Negative for cough, chest tightness and shortness of breath.    Cardiovascular:  Positive for chest pain. Negative for palpitations and leg swelling.   Gastrointestinal:  Positive for nausea and vomiting. Negative for abdominal distention, abdominal pain, blood in stool, constipation and diarrhea.   Genitourinary:  Negative for dysuria and hematuria.   Musculoskeletal:  Negative for back pain, joint swelling, neck pain and neck stiffness.   Skin:  Negative for color change, pallor, rash and wound.   Neurological:  Negative for dizziness, syncope, weakness, light-headedness and headaches.   Psychiatric/Behavioral:  Negative for agitation.    All other systems reviewed and are negative.          Objective       ED Triage Vitals [24 1303]   Temperature Pulse Blood Pressure Respirations SpO2 Patient Position - Orthostatic VS   98.7 °F (37.1 °C) 67 140/88 19 95 % Sitting      Temp Source Heart Rate Source BP Location FiO2 (%) Pain Score    Oral Monitor Left arm -- --      Vitals      Date and Time Temp Pulse SpO2 Resp BP Pain Score FACES Pain Rating User   24 1303 98.7 °F (37.1 °C) 67 95 % 19 140/88 -- -- EMY            Physical Exam  Vitals and nursing note reviewed.   Constitutional:       General: She is not in acute distress.      Appearance: Normal appearance. She is well-developed. She is not ill-appearing, toxic-appearing or diaphoretic.   HENT:      Head: Normocephalic and atraumatic.      Nose: Nose normal. No congestion or rhinorrhea.      Mouth/Throat:      Mouth: Mucous membranes are moist.      Pharynx: Oropharynx is clear. No oropharyngeal exudate or posterior oropharyngeal erythema.   Eyes:      General: No scleral icterus.        Right eye: No discharge.         Left eye: No discharge.      Extraocular Movements: Extraocular movements intact.      Conjunctiva/sclera: Conjunctivae normal.      Pupils: Pupils are equal, round, and reactive to light.   Neck:      Vascular: No JVD.      Trachea: No tracheal deviation.      Comments: Supple. Normal range of motion.   Cardiovascular:      Rate and Rhythm: Normal rate and regular rhythm.      Heart sounds: Normal heart sounds. No murmur heard.     No friction rub. No gallop.      Comments: Normal rate and regular rhythm  Pulmonary:      Effort: Pulmonary effort is normal. No respiratory distress.      Breath sounds: Normal breath sounds. No stridor. No wheezing or rales.      Comments: Clear to auscultation bilaterally  Chest:      Chest wall: No tenderness.   Abdominal:      General: Bowel sounds are normal. There is no distension.      Palpations: Abdomen is soft.      Tenderness: There is no abdominal tenderness. There is no right CVA tenderness, left CVA tenderness, guarding or rebound.      Comments: Soft, nontender, nondistended.  Normal bowel sounds throughout   Musculoskeletal:         General: No swelling, tenderness, deformity or signs of injury. Normal range of motion.      Cervical back: Normal range of motion and neck supple. No rigidity. No muscular tenderness.      Right lower leg: No edema.      Left lower leg: No edema.   Lymphadenopathy:      Cervical: No cervical adenopathy.   Skin:     General: Skin is warm and dry.      Coloration: Skin is not pale.      Findings: No  erythema or rash.   Neurological:      General: No focal deficit present.      Mental Status: She is alert. Mental status is at baseline.      Sensory: No sensory deficit.      Motor: No weakness or abnormal muscle tone.      Coordination: Coordination normal.      Gait: Gait normal.      Comments: Alert.  Strength and sensation grossly intact.  Ambulatory without difficulty at baseline.    Psychiatric:         Behavior: Behavior normal.         Thought Content: Thought content normal.         Results Reviewed       Procedure Component Value Units Date/Time    HS Troponin 0hr (reflex protocol) [[id number]]  (Normal) Collected: 12/11/24 1320    Lab Status: Final result Specimen: Blood from Arm, Left Updated: 12/11/24 1349     hs TnI 0hr 3 ng/L     Basic metabolic panel [[id number]]  (Abnormal) Collected: 12/11/24 1320    Lab Status: Final result Specimen: Blood from Arm, Left Updated: 12/11/24 1344     Sodium 137 mmol/L      Potassium 3.0 mmol/L      Chloride 101 mmol/L      CO2 28 mmol/L      ANION GAP 8 mmol/L      BUN 8 mg/dL      Creatinine 0.71 mg/dL      Glucose 100 mg/dL      Calcium 9.6 mg/dL      eGFR 113 ml/min/1.73sq m     Narrative:      National Kidney Disease Foundation guidelines for Chronic Kidney Disease (CKD):     Stage 1 with normal or high GFR (GFR > 90 mL/min/1.73 square meters)    Stage 2 Mild CKD (GFR = 60-89 mL/min/1.73 square meters)    Stage 3A Moderate CKD (GFR = 45-59 mL/min/1.73 square meters)    Stage 3B Moderate CKD (GFR = 30-44 mL/min/1.73 square meters)    Stage 4 Severe CKD (GFR = 15-29 mL/min/1.73 square meters)    Stage 5 End Stage CKD (GFR <15 mL/min/1.73 square meters)  Note: GFR calculation is accurate only with a steady state creatinine    CBC and differential [[id number]]  (Abnormal) Collected: 12/11/24 1320    Lab Status: Final result Specimen: Blood from Arm, Left Updated: 12/11/24 1326     WBC 5.16 Thousand/uL      RBC 4.56 Million/uL      Hemoglobin 13.3 g/dL      Hematocrit  41.0 %      MCV 90 fL      MCH 29.2 pg      MCHC 32.4 g/dL      RDW 13.7 %      MPV 8.9 fL      Platelets 305 Thousands/uL      nRBC 0 /100 WBCs      Segmented % 33 %      Immature Grans % 0 %      Lymphocytes % 51 %      Monocytes % 10 %      Eosinophils Relative 5 %      Basophils Relative 1 %      Absolute Neutrophils 1.72 Thousands/µL      Absolute Immature Grans 0.00 Thousand/uL      Absolute Lymphocytes 2.63 Thousands/µL      Absolute Monocytes 0.49 Thousand/µL      Eosinophils Absolute 0.27 Thousand/µL      Basophils Absolute 0.05 Thousands/µL             XR chest 1 view portable    (Results Pending)       Procedures    ED Medication and Procedure Management   None     Patient's Medications    No medications on file     No discharge procedures on file.  ED SEPSIS DOCUMENTATION   Time reflects when diagnosis was documented in both MDM as applicable and the Disposition within this note       Time User Action Codes Description Comment    12/11/2024  1:35 PM Raúl Rangel Add [R07.9] Chest pain     12/11/2024  1:35 PM Raúl Rangel Add [R11.2] Nausea & vomiting     12/11/2024  1:52 PM Raúl Rangel Add [E87.6] Hypokalemia                  Raúl Rangel MD  12/11/24 4724

## 2025-01-14 ENCOUNTER — NURSE TRIAGE (OUTPATIENT)
Age: 30
End: 2025-01-14

## 2025-01-14 NOTE — TELEPHONE ENCOUNTER
Call transferred from [place] who states patient reports vaginitis concerns. Call was disconnected from other line prior to warm transfer. Attempted to call back, no response and left  for call back.

## 2025-01-14 NOTE — TELEPHONE ENCOUNTER
"Spoke with patient who reports she started with watery vaginal discharge with odor, mild itching about 1-2 weeks ago.  She feels like it is BV and a yeast infection.  Unable to schedule appt, warm transfer to  to assist.      Reason for Disposition   Vaginal odor (bad smell) not improved > 3 days following Care Advice    Answer Assessment - Initial Assessment Questions  1. SYMPTOM: \"What's the main symptom you're concerned about?\" (e.g., pain, itching, dryness)      Vaginal discharge, watery  3. ONSET: \"When did the  symptoms  start?\"      1-2 weeks ago  4. PAIN: \"Is there any pain?\" If Yes, ask: \"How bad is it?\" (Scale: 1-10; mild, moderate, severe)      denies  5. ITCHING: \"Is there any itching?\" If Yes, ask: \"How bad is it?\" (Scale: 1-10; mild, moderate, severe)      mild  6. CAUSE: \"What do you think is causing the discharge?\" \"Have you had the same problem before?\" \"What happened then?\"      BV/yeast?  7. OTHER SYMPTOMS: \"Do you have any other symptoms?\" (e.g., fever, itching, vaginal bleeding, pain with urination, injury to genital area, vaginal foreign body)      denies  8. PREGNANCY: \"Is there any chance you are pregnant?\" \"When was your last menstrual period?\"      1/3/25    Protocols used: Vaginal Symptoms-Adult-[place]    "

## 2025-03-24 ENCOUNTER — OFFICE VISIT (OUTPATIENT)
Age: 30
End: 2025-03-24
Payer: COMMERCIAL

## 2025-03-24 VITALS
DIASTOLIC BLOOD PRESSURE: 84 MMHG | SYSTOLIC BLOOD PRESSURE: 116 MMHG | BODY MASS INDEX: 24.17 KG/M2 | OXYGEN SATURATION: 99 % | HEART RATE: 64 BPM | TEMPERATURE: 97.5 F | RESPIRATION RATE: 18 BRPM | WEIGHT: 143 LBS

## 2025-03-24 DIAGNOSIS — H00.011 HORDEOLUM EXTERNUM OF RIGHT UPPER EYELID: Primary | ICD-10-CM

## 2025-03-24 PROCEDURE — G0382 LEV 3 HOSP TYPE B ED VISIT: HCPCS | Performed by: PHYSICIAN ASSISTANT

## 2025-03-24 RX ORDER — OFLOXACIN 3 MG/ML
1 SOLUTION/ DROPS OPHTHALMIC 4 TIMES DAILY
Qty: 5 ML | Refills: 0 | Status: SHIPPED | OUTPATIENT
Start: 2025-03-24

## 2025-03-24 NOTE — LETTER
March 24, 2025     Patient: Elena Abbasi   YOB: 1995   Date of Visit: 3/24/2025       To Whom It May Concern:    It is my medical opinion that Elena Abbasi may return to work on 3/25/25 .    If you have any questions or concerns, please don't hesitate to call.         Sincerely,        Geoffrey Oakes PA-C    CC: No Recipients

## 2025-03-24 NOTE — PROGRESS NOTES
[facility]        NAME: Elena Abbasi is a 29 y.o. female  : 1995    MRN: [id number]  DATE: 2025  TIME: 3:16 PM    Assessment and Plan   Hordeolum externum of right upper eyelid [H00.011]  1. Hordeolum externum of right upper eyelid  ofloxacin (OCUFLOX) 0.3 % ophthalmic solution              The patient and/or parent/legal guardian verbalized understanding of exam findings and   Treatment plan. We engaged in the shared decision-making process and treatment options were   discussed at length with the patient.  All questions, concerns and complaints were answered and   addressed to the patient's' and/or parent/legal guardians's satisfaction.    Patient Instructions   There are no Patient Instructions on file for this visit.    Follow up with PCP in 3-5 days.  Proceed to  ER if symptoms worsen.    If tests are performed, our office will contact you with results only if   changes need to made to the care plan discussed with you at the visit.   [facility]     Chief Complaint     Chief Complaint   Patient presents with   • Eye Pain     Pain, swelling, redness, drainage from R eye X 3 days. Denies injury         History of Present Illness       HPI  The patient was in complaining of pain swelling redness drainage from the right eye for the past 3 days.  No injury to the eye.  No loss of visual acuity no visual disturbance.  She reports that she does wear mascara make-up.  She also wears contact lenses.  She has worn the contact once since her initial onset of symptoms    Review of Systems   Review of Systems  All other related systems reviewed with patient or accompanying historian and are negative except as noted in HPI    Current Medications       Current Outpatient Medications:   •  ofloxacin (OCUFLOX) 0.3 % ophthalmic solution, Administer 1 drop to the right eye 4 (four) times a day, Disp: 5 mL, Rfl: 0    Current Allergies     Allergies as of  2025   • (No Known Allergies)            The following portions of the patient's history were reviewed and updated as appropriate: allergies, current medications, past family history, past medical history, past social history, past surgical history and problem list.     Past Medical History:   Diagnosis Date   • Bacterial vaginitis    • Carrier of ureaplasma urealyticum    • Medical     • Migraine    • Urinary tract infection    • Yeast vaginitis        No past surgical history on file.    Family History   Problem Relation Age of Onset   • Obesity Mother    • Hypertension Family    • Diabetes Family    • Osteoarthritis Family    • Depression Family    • Thyroid disease Family          Medications have been verified.        Objective   /84 (BP Location: Right arm, Patient Position: Sitting, Cuff Size: Adult)   Pulse 64   Temp 97.5 °F (36.4 °C) (Tympanic)   Resp 18   Wt 64.9 kg (143 lb)   LMP 2025 (Exact Date)   SpO2 99%   BMI 24.17 kg/m²   Patient's last menstrual period was 2025 (exact date).       Physical Exam     Physical Exam  Constitutional:       General: She is not in acute distress.     Appearance: She is well-developed.   HENT:      Head: Normocephalic and atraumatic.   Eyes:      General: No scleral icterus.        Right eye: No discharge.         Left eye: No discharge.      Extraocular Movements: Extraocular movements intact.      Conjunctiva/sclera: Conjunctivae normal.      Pupils: Pupils are equal, round, and reactive to light.      Comments: Hordeolum externum the right upper eyelid no conjunctivitis or discharge   Neck:      Trachea: No tracheal deviation.   Pulmonary:      Effort: Pulmonary effort is normal. No respiratory distress.      Breath sounds: No stridor.   Musculoskeletal:      Cervical back: Normal range of motion.   Skin:     General: Skin is warm and dry.      Findings: No erythema.   Neurological:      Mental Status: She is alert and oriented to  "person, place, and time.   Psychiatric:         Behavior: Behavior normal.         Ortho Exam        Procedures  No Procedures performed today        Note: Portions of this record may have been created with voice recognition software. Occasional wrong word or \"sound a like\" substitutions may have occurred due to the inherent limitations of voice recognition software. Please read the chart carefully and recognize, using context, where substitutions have occurred.*      "

## 2025-03-31 ENCOUNTER — ANNUAL EXAM (OUTPATIENT)
Dept: OBGYN CLINIC | Facility: CLINIC | Age: 30
End: 2025-03-31
Payer: COMMERCIAL

## 2025-03-31 VITALS — SYSTOLIC BLOOD PRESSURE: 122 MMHG | WEIGHT: 143.6 LBS | DIASTOLIC BLOOD PRESSURE: 84 MMHG | BODY MASS INDEX: 24.27 KG/M2

## 2025-03-31 DIAGNOSIS — K59.00 CONSTIPATION, UNSPECIFIED CONSTIPATION TYPE: ICD-10-CM

## 2025-03-31 DIAGNOSIS — Z12.4 SCREENING FOR CERVICAL CANCER: ICD-10-CM

## 2025-03-31 DIAGNOSIS — N89.8 VAGINAL DISCHARGE: ICD-10-CM

## 2025-03-31 DIAGNOSIS — Z11.3 SCREENING FOR STD (SEXUALLY TRANSMITTED DISEASE): ICD-10-CM

## 2025-03-31 DIAGNOSIS — Z01.419 WOMEN'S ANNUAL ROUTINE GYNECOLOGICAL EXAMINATION: Primary | ICD-10-CM

## 2025-03-31 DIAGNOSIS — Z30.09 BIRTH CONTROL COUNSELING: ICD-10-CM

## 2025-03-31 PROCEDURE — 87660 TRICHOMONAS VAGIN DIR PROBE: CPT

## 2025-03-31 PROCEDURE — G0145 SCR C/V CYTO,THINLAYER,RESCR: HCPCS

## 2025-03-31 PROCEDURE — 87491 CHLMYD TRACH DNA AMP PROBE: CPT

## 2025-03-31 PROCEDURE — 87480 CANDIDA DNA DIR PROBE: CPT

## 2025-03-31 PROCEDURE — 87510 GARDNER VAG DNA DIR PROBE: CPT

## 2025-03-31 PROCEDURE — G0476 HPV COMBO ASSAY CA SCREEN: HCPCS

## 2025-03-31 PROCEDURE — 87591 N.GONORRHOEAE DNA AMP PROB: CPT

## 2025-03-31 PROCEDURE — 99395 PREV VISIT EST AGE 18-39: CPT

## 2025-03-31 NOTE — PROGRESS NOTES
Name: Elena Abbasi      : 1995      MRN: [id number]  Encounter Provider: Maricel Queen PA-C  Encounter Date: 3/31/2025   Encounter department: [facility]  :  Assessment & Plan  Women's annual routine gynecological examination     Pap with high risk HPV testing every 5 years, if normal.   Sexually transmitted infection testing as indicated.  Exercise most days of week-minimum of 150-300 minutes per week.   Obtain appropriate diet and hydration.   Calcium 1000mg (in divided doses-max 600 mg at one time) + 600 vit D daily.    monthly breast self exam recommended.   Kegels 20 times twice daily.   Call your insurance company to verify coverage prior to completing any ordered tests.   Return to office in one year or sooner, if needed.      Constipation, unspecified constipation type     Increase hydration and fiber intake  Miralax PRN  Stay active    Birth control counseling     Pt desires BC. I thoroughly r/w pt all available options for BC including OCPs, patch, ring, Depo Provera, IUDs, Nexplanon and condoms. Aware of need for condoms for continued STD protection regardless of type of BC she chooses. Aware of risks and benefits and usage of each form of BC. Aware of start up, back up, etc.    Patient does not have any contraindications to estrogen containing therapy    Patient would like to think about her options before selecting which when she would like to start.    Verbal and written education provided    Patient will message via [employer] if she determines she would like to proceed with a birth control    Vaginal discharge  Physical exam benign  Vaginosis swab collected  Good perineal hygiene encouraged  Management pending results  Orders:    VAGINOSIS DNA PROBE    Screening for STD (sexually transmitted disease)    Orders:    HIV 1/2 AG/AB w Reflex [facility] for 2 yr old and above    Hepatitis B surface antigen; Future    Hepatitis C antibody; Future     RPR-Syphilis Screening (Total Syphilis IGG/IGM)    Chlamydia/GC amplified DNA by PCR    Screening for cervical cancer    Orders:    Liquid-based pap, screening        History of Present Illness   HPI  Elena Abbasi is a 29 y.o. female who presents for an annual examination      LMP 3/22/25 lasting 3 days predictable occurring monthly. Sometimes painful, she uses advil, sleep and bath/heating pad  which helps   Sexually active Yes Monogamous   Birth control Yes  Desiring pregnancy in the next year No  History of abnormal pap: No  Last pap 2018 , Findings NILM , Next pap: today  Self breast exam Yes  HPV vaccine series completed: No    Patient is concerned about BV and yeast infection she had previously had vaginal odor and thick white clumpy discharge. she used a boric acid suppository and then she had her menses and it has since resolved. She denies vaginal itching, odor, Patient notes thick white discharge .      Hereditary Cancer Screening  FH Breast/Ovarian cancer: denies  FH Uterine cancer: denies  FH Colon cancer: denies    Review of Systems   Constitutional:  Negative for chills, fatigue, fever and unexpected weight change.   Eyes:  Negative for visual disturbance.   Respiratory:  Negative for shortness of breath.    Cardiovascular:  Negative for chest pain and palpitations.   Gastrointestinal:  Positive for constipation. Negative for abdominal pain, anal bleeding, blood in stool, diarrhea, nausea and vomiting.   Genitourinary:  Positive for vaginal discharge. Negative for difficulty urinating, dyspareunia, dysuria, frequency, hematuria, menstrual problem, pelvic pain, urgency, vaginal bleeding and vaginal pain.   Neurological:  Negative for dizziness, syncope, light-headedness and headaches.   Psychiatric/Behavioral:  Negative for dysphoric mood. The patient is not nervous/anxious.      Current Outpatient Medications on File Prior to Visit   Medication Sig Dispense Refill    ofloxacin  (OCUFLOX) 0.3 % ophthalmic solution Administer 1 drop to the right eye 4 (four) times a day 5 mL 0     No current facility-administered medications on file prior to visit.      Social History     Tobacco Use    Smoking status: Never    Smokeless tobacco: Never   Vaping Use    Vaping status: Never Used   Substance and Sexual Activity    Alcohol use: Yes     Alcohol/week: 7.0 standard drinks of alcohol     Types: 7 Cans of beer per week     Comment: daily    Drug use: Yes     Types: Marijuana    Sexual activity: Yes     Partners: Male     Birth control/protection: None        Objective   /84 (BP Location: Left arm, Patient Position: Sitting, Cuff Size: Standard)   Wt 65.1 kg (143 lb 9.6 oz)   LMP 03/24/2025 (Exact Date)   BMI 24.27 kg/m²      Physical Exam  Vitals and nursing note reviewed.   Constitutional:       General: She is not in acute distress.     Appearance: She is well-developed.   HENT:      Head: Normocephalic and atraumatic.   Eyes:      Extraocular Movements: Extraocular movements intact.   Pulmonary:      Effort: Pulmonary effort is normal.   Chest:   Breasts:     Right: No swelling, bleeding, inverted nipple, mass, nipple discharge, skin change or tenderness.      Left: No swelling, bleeding, inverted nipple, mass, nipple discharge, skin change or tenderness.   Abdominal:      Palpations: Abdomen is soft.      Tenderness: There is no abdominal tenderness.      Hernia: There is no hernia in the left inguinal area or right inguinal area.   Genitourinary:     General: Normal vulva.      Exam position: Lithotomy position.      Pubic Area: No rash.       Labia:         Right: No rash, tenderness or lesion.         Left: No rash, tenderness or lesion.       Urethra: No urethral pain or urethral swelling.      Vagina: No vaginal discharge, erythema, tenderness, bleeding or lesions.      Cervix: No cervical motion tenderness, discharge, friability, lesion, erythema or cervical bleeding.      Uterus:  Not enlarged and not tender.       Adnexa:         Right: No mass, tenderness or fullness.          Left: No mass, tenderness or fullness.     Lymphadenopathy:      Upper Body:      Right upper body: No supraclavicular, axillary or pectoral adenopathy.      Left upper body: No supraclavicular, axillary or pectoral adenopathy.      Lower Body: No right inguinal adenopathy. No left inguinal adenopathy.   Skin:     General: Skin is warm and dry.   Neurological:      Mental Status: She is alert.   Psychiatric:         Mood and Affect: Mood normal.         Behavior: Behavior normal.         Maricel Queen PA-C

## 2025-03-31 NOTE — PATIENT INSTRUCTIONS
[facility]  Clothing to avoid: Synthetic underwear, tight jeans/pants, wearing exercise garments for prolonged periods  Cleansing products to avoid: scented soaps/shampoos, bubble baths, scented detergents, baby wipes/toilet wipes, feminine sprays or douches    Suggested Clothing: cotton underwear, loose pants, change clothing after completion of exercise  Suggested Cleansing Products: fragrance free pH neutral cleansers, warm water baths without fragrance products added, unscented detergents, instead of wet toilet wipes rinse with a perineal spray bottle.        BIRTH CONTROL OPTIONS     There are a number of methods available to help prevent pregnancy.    Hormonal methods of birth control (contraception) contain either estrogen and progestin or progestin only; they are a safe and reliable way to prevent pregnancy for most people. Hormonal methods include an implant, an intrauterine device (IUD), injections, pills, vaginal rings, and skin patches.    This document discusses the various hormonal methods of birth control that are available.     CHOOSING A BIRTH CONTROL METHOD     It can be difficult to decide which birth control method is best due to the variety of options available. The best method is one that will be used consistently and does not cause bothersome side effects. Other factors to consider include:    ?Efficacy (how well it works to prevent pregnancy)  ?Convenience  ?How long the drug or device can be used  ?Whether and how it affects your monthly period  ?Type and frequency of side effects  ?Affordability  ?Privacy concerns  ?Whether or not it also protects against sexually transmitted diseases  ?How quickly your fertility will return if you stop taking it    You should also think about whether you are comfortable remembering to take a pill every day, whether you want to involve your partner(s) in the decision, and whether and when you might want to get pregnant in the future. No birth control is  perfect; you must balance the advantages and disadvantages of the different options and decide which method is best for you.    BIRTH CONTROL IMPLANT     The implant (brand name: Nexplanon) is a small wilman that contains the hormone progestin. It is inserted under the skin into the upper inner arm by a health care provider. It is effective for at least three years but can be removed earlier if you decide you want to get pregnant or simply prefer not to continue use of the implant. Insertion and removal can be done in an office or clinic.  The implant is one of the most effective methods of birth control. It provides at least three years of protection from pregnancy as progestin is slowly absorbed into the surrounding tissues. Depending on when during the menstrual cycle the implant is placed, backup birth control (for instance condoms) may be recommended for one week following placement. Irregular bleeding is the most bothersome side effect. Fertility returns rapidly after the wilman is removed.    Side effects -- The most common side effects of the implant are irregular/unpredictable bleeding.    IUD WITH PROGESTIN     There are several intrauterine devices (IUDs) that contain a hormone called levonorgestrel (a type of progestin). One type (brand names: Liletta, Mirena) can be left in place for up to six years. The other options (brand names: Kyleena, Luba) are somewhat smaller and can be left in place for up to five years (Kyleena) or three years (Luba). All of the levonorgestrel IUDs are highly effective in preventing pregnancy.    Side effects -- Although irregular bleeding is common initially after progestin IUD placement, bleeding tends to diminish over time. With ongoing use, people using Mirena or Liletta often experience little or no bleeding. Those who use Kyleena or Luba are more likely to continue having monthly periods.      INJECTABLE BIRTH CONTROL     The only injectable contraceptive currently  "available in the [place] is depot medroxyprogesterone acetate or DMPA (brand name: Depo-Provera). DMPA is injected deep into a muscle, such as in the buttock or upper arm, or injected subcutaneously (under the skin). With either type of injection, this contraceptive is given once every three months.    DMPA prevents ovulation and thickens the cervical mucus, making the cervix impenetrable to sperm. If you get your first dose of DMPA during the first seven days of your menstrual period, it prevents pregnancy immediately. If you get your first dose after the seventh day of your period, you should use a second form of birth control (eg, condoms) for seven days. DMPA is very effective, with a failure (pregnancy) rate of less than 1 percent when repeat injections are given on time.    Side effects -- The most common side effects of DMPA are irregular or prolonged bleeding and spotting, particularly during the first few months of use. Up to 50 percent of people completely stop having menstrual periods (doctors call this \"amenorrhea\") after one year of DMPA use. Monthly periods generally return within six months of the last DMPA injection, although, in some cases, it may take longer for periods to return. Some people gain weight while they are getting DMPA injections.    In people who get the DMPA shot, there is no increased risk of cardiovascular complications or cancer. Use of DMPA is associated with decreased bone mineral density; however, this effect is mostly or completely reversed after stopping the injections. Studies have not shown an increased risk of bone fractures in people who have used DMPA in the past.    Because DMPA is long-acting, it may not be ideal if you want to get pregnant shortly after stopping the medication. Although most people are able to conceive within 10 months, fertility may not return for up to 18 months after the last injection.    Benefits compared with birth control pills -- There " "are a number of people who prefer DMPA to the pill, including those who:    ?Have difficulty remembering to take a pill every day  ?Value the privacy with DMPA use  ?Cannot use estrogen  ?Also take seizure medications, which can be less effective with combination hormonal contraceptives.    Additional benefits of DMPA include a decreased risk of uterine cancer and pelvic inflammatory disease.    BIRTH CONTROL PILLS   Most oral contraceptives, commonly called \"the pill,\" contain a combination of estrogen and progestin. The combination pill reduces the risk of pregnancy by:    ?Preventing ovulation  ?Keeping the mucus in the cervix thick and impenetrable to sperm  ?Keeping the lining of the uterus thin    The pill makes menstrual bleeding more regular, with fewer days of flow and overall lighter flow. Other benefits of the pill include a reduction in:    ?Menstrual cramps or pain  ?Risk of ovarian cancer or cancer of the endometrium (uterine lining)  ?Acne  ?Iron-deficiency anemia (a low blood count due to low iron levels)    One potential downside of the pill is that, in order to maximize efficacy, you have to remember to take it every day, ideally at the same time of day. Some people find this difficult or inconvenient.    Efficacy -- When taken properly, birth control pills are a highly effective form of contraception; however, skipping pills or forgetting to restart the pill after the week of your period will increase risk of pregnancy. Approximately 9 out of every 100 people who take birth control pills for one year will have an unintended pregnancy.    Missed pills are a common cause of pregnancy. In general, if you forget to take an active pill (containing hormones), you should take it as soon as possible and take the next one at the usual time it is due. If you miss more than two pills, use a backup method of birth control (eg, condoms) for seven days.    Side effects -- Possible side effects of the pill " "include:    ?Nausea, breast tenderness, bloating, and mood changes - These typically improve within two to three months without treatment (while continuing the pill).    ?Irregular bleeding - Irregular bleeding, also called \"breakthrough bleeding\" or \"spotting,\" is particularly common during the first few months of taking the pill. It almost always resolves without any treatment within two to three months. Forgetting a pill can also cause breakthrough bleeding.    Taking birth control pills does not cause weight gain.    Potential complications -- When the pill was first introduced in the 1960s, the doses of both hormones (estrogen and progestin) were quite high. Because of this, cardiovascular complications occurred, such as high blood pressure, heart attacks, strokes, and blood clots in the legs and lungs.    The pills prescribed today have much lower doses of progestin and estrogen, which has decreased the risk of these complications. As a result, birth control pills are now considered a reliable and safe option for most healthy, nonsmoking people. While there is a very small risk of blood clots, this risk is actually lower than the risk during pregnancy or soon after giving birth.  Experts have studied the possible association between taking the pill and the risk of breast cancer. While these studies have had mixed results, there is some evidence that people who take the pill do have a slightly higher risk of getting breast cancer later in life than those who do not. However, if there is an increase in risk, it is very small, especially in younger people. It is important to balance this against the benefits of the pill, which include not only pregnancy prevention but a sizable reduction in the risk of ovarian and endometrial cancer.     Who should not take the pill? -- Because of an increased risk of complications, you should not take the pill if you:    ?Are 35 or older and smoke cigarettes (as this puts you at " "high risk for cardiovascular complications such as blood clots or heart attack).    ?Could be pregnant.    ?Have had blood clots or a stroke in the past (as this increases your risk of blood clots while taking the pill).    ?Have a history of an \"estrogen-dependent\" tumor (eg, breast or uterine cancer).    ?Have abnormal or unexplained menstrual bleeding (in which case the cause of the bleeding should be investigated before starting the pill).    ?Have active liver disease (the pill could worsen the liver disease).    ?Have migraine headaches associated with certain visual or other neurologic symptoms (eg, aura), which increases your risk of stroke.    If you are taking the pill, tell your health care provider right away if you experience abdominal pain, chest pain, severe headaches, eye problems, or severe leg pain. These could be symptoms of several serious conditions including heart attack, blood clot, stroke, and liver or gallbladder disease.    Some people may take the pill under certain circumstances but need close monitoring. Talk with your doctor or nurse if you:    ?Have high blood pressure - You may experience a further increase in blood pressure and should be monitored more frequently while on the pill.    ?Take certain medication for seizures (epilepsy) - In this case, the pill may be slightly less effective in preventing pregnancy because the seizure medicines change the way it is metabolized.    ?Have diabetes mellitus - People with diabetes and kidney disease or vascular complications from diabetes should not use the pill.    Medication interactions -- The pill may not work as well to prevent pregnancy if you also take certain other medications.    Anticonvulsants -- Some anticonvulsants, including phenytoin (sample brand names: Dilantin, Phenytek), carbamazepine (sample brand names: Carbatrol, Tegretol), barbiturates, primidone (brand name: Mysoline), topiramate (sample brand name: Topamax), and " oxcarbazepine (sample brand name: Trileptal), decrease the effectiveness of birth control pills as well as patches, vaginal rings, and the implant. As a result, people who take these anticonvulsants are advised to avoid hormonal birth control methods (with the exception of depot medroxyprogesterone acetate or DMPA [brand name: Depo-Provera] and intrauterine devices [IUDs] with progestin).     Other anticonvulsants do not appear to reduce contraceptive efficacy, including gabapentin (sample brand names: Gralise, Neurontin), lamotrigine (sample brand names: Lamictal, Subvenite), levetiracetam (sample brand names: Keppra, Roweepra), tiagabine (brand name: Gabitril), and valproic acid (brand name: Depakote). However, there is some concern that oral contraceptives may reduce the effectiveness of lamotrigine, potentially increasing the risk of seizures.    If you take any anti-seizure medications, it is important to talk with your health care provider about possible interactions before starting the pill or another hormonal birth control method.  Antibiotics -- Rifampin, which is sometimes used to treat tuberculosis, can decrease the efficacy of hormonal birth control. As a result, people who take rifampin are advised to avoid most hormonal birth control methods, with the exception of DMPA (brand name: Depo-Provera) and IUDs with progestin. Alternative options include a copper IUD, condoms, or a diaphragm, or tubal ligation (permanent birth control).     Contrary to popular belief, other (more commonly used) antibiotics do not affect the efficacy of hormonal birth control methods. Backup contraception is not needed when you take these antibiotics.    Bowlus's Wort -- Nathaniel's wort, an herbal supplement sometimes taken to treat depression, may reduce the effectiveness of birth control pills, and possibly the patch and ring.    Starting the pill -- Ideally, you should start taking the pill on the first day of your  "period. This provides protection from pregnancy beginning immediately.    As long as you are sure you are not pregnant (which can be confirmed with a urine pregnancy test), it is also an option to start the pill as soon as your doctor prescribes it, regardless of where you are in your menstrual cycle. This is called the \"quick start\" method. If you do this, you will need to use a backup form of birth control (eg, condoms) for the first seven days after the quick start.    Many people start taking the pill on the first Sunday after their period starts (because most pill packs are arranged for a Sunday start). If you do this, you will also need to use some form of backup contraception (eg, condoms) for the first seven days after the Sunday start.    When to expect bleeding -- Traditionally, the pill is taken on a 28-day cycle that includes 21 days of hormone pills followed by 7 days of placebo pills (\"no hormone pills\") that do not contain hormones. Newer formulations have a longer duration of hormone pills (eg, 24 days) and fewer days of placebo pills (eg, 4 days). It is not necessary to take the placebo pills as they do not contain any active ingredients, but many people find it easier to stay on schedule when they continue to take a daily pill throughout the entire 28-day cycle.    Bleeding should occur during the fourth week of the pill pack (ie, the week that you are taking placebo pills or no pills). However, some people have irregular breakthrough bleeding or spotting in the first few months.     Continuous dosing -- Some people prefer to take hormone-containing birth control pills continuously, without the week of no pills or placebo pills. This allows you to control whether and when you have a monthly period. This regimen may be a good option if you have painful periods, endometriosis (a condition that causes pelvic pain), or bothersome premenstrual symptoms, including mood changes.    Traditional birth " "control pill packs can be dosed continuously to get rid of monthly bleeding. To do this, you take the first three weeks of a pill pack, then immediately start a new pack the next day (without taking a break or taking the placebo pills). This can be continued for as long as desired. A pill called Seasonale was specifically designed for continuous dosing. You take an active pill every day for 12 weeks, followed by seven days of placebo pills. With this regimen, you only experience bleeding once every three months. Seasonique it is similar; it contains 84 days of active pills and 7 days of low-dose estrogen pills. The addition of low-dose estrogen pills is intended to reduce breakthrough bleeding and possibly other symptoms, such as mood changes and headaches. Both are available as generic medications that work in the same way.    Over time, using continuous-dosing regimens results in fewer bleeding episodes per year (or no bleeding at all); however, many people experience unpredictable breakthrough bleeding when starting a continuous-dosing regimen. Breakthrough bleeding is inconvenient but does not mean that the pills are less effective (assuming you are taking them at the same time each day and not skipping any active pills).    Progestin-only pills -- Some pills contain only progestin (sometimes called the \"mini pill\"); these may be an option for people who cannot or should not take estrogen. This includes those who are breastfeeding or who have worsened migraines or high blood pressure with combination contraceptive pills. Progestin-only pills appear to be as effective as combination pills when taken at the same time every day, but they have a slightly higher failure rate if you are more than three hours late in taking them. A backup method of birth control should be used for seven days if you forget a pill or are more than three hours late in taking it. Breakthrough bleeding or spotting is common with both types " "of progestin-only pills.  Progestin-only pills are available in 28-day packs. Two types of progestin-only pills are available in the [place]. For one type (containing norethindrone), all 28 pills contain the hormone (ie, there is no \"placebo week\"). For the second type (containing drospirenone), each pack includes 24 hormone pills and 4 placebo pills. The drospirenone pill may be more effective than the norethindrone pill. A disadvantage of the drospirenone pill is that no generic is available, which may make it more expensive.    VAGINAL RINGS     These are flexible plastic rings that contain estrogen and a progestin. The ring is inserted into the vagina, and the hormones are slowly absorbed into the body. This prevents pregnancy, similar to the pill. You keep the ring in your vagina for three weeks then leave it out for one week, during which you will experience bleeding. The following week, you insert a new ring or reinsert the previous one (one type of ring is reusable for approximately one year, while the other type needs to be discarded and replaced each month). As long as the ring remains in the vagina and is not uncomfortable, the ring's position inside the vagina is not important.    You can start using the vaginal ring anytime during your menstrual cycle. If you start it more than seven days after the first day of your last period, or if you are not sure when your last period started, you should use a backup method of contraception (eg, condoms) for the first seven days of inserting the ring.    Most people cannot feel the ring while it is in place, and in most cases, it is easy to insert and remove. It may be removed for a short time if desired, as discussed below, but should be left in during intercourse. Your partner most likely will not be able to feel the ring. You should use your fingers to check before and after sex to confirm the ring is in place. If the ring is left out for more than a few " hours, you may be able to put it back in, or you may need to discard it, depending on which type of ring you use and where you are in your menstrual cycle. Because the instructions can vary, it is important to read the information that comes with your ring.    If you use the ring that gets replaced each month (brand names: NuvaRing, EluRyng):    ?During the first two weeks of your cycle, you can reinsert the ring and continue with the usual schedule. The ring should be rinsed in cool or warm (but not hot) water, without soap or detergent, before it is reinserted.    ?During the third week, you can insert a new ring and begin a new cycle immediately, in which case you will not get a period. If the ring was previously in place for at least seven days in a row, you can also choose to leave the ring out for up to one week (during which you have your period) and then insert a new one.    ?Regardless of where you are in your cycle, if the ring is left out for more than three hours, you should use a backup method of birth control (eg, condoms) for the next seven days. Any backup method other than the female condom or diaphragm can be used.    If you use the reusable ring that gets reinserted each month (brand name: Annovera), the instructions are the same regardless of where you are in your menstrual cycle:    ?If the ring is out for two hours or less, you can reinsert it. Before doing so, wash the ring with mild soap, rinse with water, and gently dry.    ?If the ring is out for more than two hours, either continuously or cumulatively (eg, if you take it out two separate times that add up to more than two hours), you should also clean and insert the ring. However, you also must use a backup form of birth control (eg, condoms) for the next seven days.    As with the pill, in addition to being an effective method of preventing pregnancy, the vaginal ring also has other potential benefits. These include a reduction in  "menstrual cramps, iron-deficiency anemia, and the risk of certain cancers.    Risks and side effects are also similar to those of oral contraceptives.     BIRTH CONTROL SKIN PATCHES     Birth control skin patches (sample brand names: Xulane, Twirla) contain estrogen and progestin, similar to oral contraceptives. Both patches are similar to the pill in terms of efficacy in preventing pregnancy. Some people prefer patches because they do not require remembering to take a pill each day; on the other hand, some people do not like having a visible patch on their skin. Neither patch type should be used by people with a body mass index (BMI) of 30 kg/m2 or higher.    The patch is worn for one week on the shoulder, upper back, abdomen, or buttock; one type (brand name: Twirla) can also be worn on the upper arm. After one week, you remove the old patch and apply a new one; this is done for three weeks. During the fourth week, you do not apply a new patch; you will experience bleeding during this time.    The patch is ideally started on the first day of your period. This approach provides protection from pregnancy immediately. If you prefer, you can also start using the patch on the day it is prescribed, regardless of where you are in your menstrual cycle (called \"quick start\"). If you do this, you will need to use a backup form of birth control (eg, condoms) for the first seven days.  As with the pill, in addition to being an effective method of preventing pregnancy, the patch also likely has other potential benefits, although studies are limited. These include a reduction in menstrual cramps, iron-deficiency anemia, and the risk of certain cancers.     While efficacy is similar to the pill, the patch may deliver a higher overall dose of estrogen. Some studies found that this was associated with an approximate doubling of the risk of blood clots compared with use of oral contraceptives (the pill). However, other studies " found no increase in risk compared with people using the pill. Further study is needed to define this risk.    PREGNANCY AFTER HORMONAL BIRTH CONTROL     The length of time it takes to become pregnant after use of a hormonal method of birth control depends upon which method was used, as well as some individual factors.  Most people return to their normal level of fertility within a cycle or two. For some, it may take several months before their cycle (including when they ovulate) becomes regular and they can get pregnant. This is more likely for people whose periods were irregular before starting birth control. However, hormonal birth control does not increase the risk of infertility. In general:    ?People who use the pill, skin patch, or vaginal ring usually start ovulating regularly again within one to three months of stopping birth control.    ?With injectable depot medroxyprogesterone acetate (DMPA; brand name: Depo-Provera), return of fertility can be delayed. Approximately half of people who want to be pregnant are pregnant within 10 months of stopping DMPA. However, some people will not get their periods back for up to 18 months.    ?People who get an implant (eg, Nexplanon) or an intrauterine device (IUD) usually begin to ovulate again within one month after the device is removed.    EMERGENCY CONTRACEPTION     If you have unprotected sex or a problem with your birth control (for example, you miss a pill, your skin patch falls off, or your vaginal ring falls out), you can use emergency contraception to reduce your risk of pregnancy. There are two forms of emergency contraception, the copper intrauterine device (IUD) and pills. Emergency contraception should be taken as soon as possible after sex, ideally within 120 hours (five days). More information about this is available separately.     [email or web address]    attain normal activities in 6 weeks regular diet, pelvic rest, no lifting greater than 15 pounds

## 2025-04-01 LAB
CANDIDA RRNA VAG QL PROBE: NOT DETECTED
G VAGINALIS RRNA GENITAL QL PROBE: NOT DETECTED
HPV HR 12 DNA CVX QL NAA+PROBE: NEGATIVE
HPV16 DNA CVX QL NAA+PROBE: NEGATIVE
HPV18 DNA CVX QL NAA+PROBE: NEGATIVE
T VAGINALIS RRNA GENITAL QL PROBE: NOT DETECTED

## 2025-04-02 LAB
C TRACH DNA SPEC QL NAA+PROBE: NEGATIVE
N GONORRHOEA DNA SPEC QL NAA+PROBE: NEGATIVE

## 2025-04-03 LAB
LAB AP GYN PRIMARY INTERPRETATION: NORMAL
Lab: NORMAL

## 2025-04-04 ENCOUNTER — RESULTS FOLLOW-UP (OUTPATIENT)
Dept: OBGYN CLINIC | Facility: CLINIC | Age: 30
End: 2025-04-04